# Patient Record
Sex: FEMALE | Race: WHITE | NOT HISPANIC OR LATINO | Employment: FULL TIME | URBAN - METROPOLITAN AREA
[De-identification: names, ages, dates, MRNs, and addresses within clinical notes are randomized per-mention and may not be internally consistent; named-entity substitution may affect disease eponyms.]

---

## 2023-08-28 LAB
ALANINE AMINOTRANSFERASE (SGPT) (U/L) IN SER/PLAS: 19 U/L (ref 7–45)
ALBUMIN (G/DL) IN SER/PLAS: 4.6 G/DL (ref 3.4–5)
ALKALINE PHOSPHATASE (U/L) IN SER/PLAS: 82 U/L (ref 33–136)
ANION GAP IN SER/PLAS: 10 MMOL/L (ref 10–20)
ASPARTATE AMINOTRANSFERASE (SGOT) (U/L) IN SER/PLAS: 19 U/L (ref 9–39)
BILIRUBIN TOTAL (MG/DL) IN SER/PLAS: 0.3 MG/DL (ref 0–1.2)
CALCIUM (MG/DL) IN SER/PLAS: 10.7 MG/DL (ref 8.6–10.6)
CARBON DIOXIDE, TOTAL (MMOL/L) IN SER/PLAS: 27 MMOL/L (ref 21–32)
CHLORIDE (MMOL/L) IN SER/PLAS: 107 MMOL/L (ref 98–107)
CHOLESTEROL (MG/DL) IN SER/PLAS: 270 MG/DL (ref 0–199)
CHOLESTEROL IN HDL (MG/DL) IN SER/PLAS: 54.7 MG/DL
CHOLESTEROL/HDL RATIO: 4.9
CREATININE (MG/DL) IN SER/PLAS: 0.65 MG/DL (ref 0.5–1.05)
ESTIMATED AVERAGE GLUCOSE FOR HBA1C: 103 MG/DL
GFR FEMALE: >90 ML/MIN/1.73M2
GLUCOSE (MG/DL) IN SER/PLAS: 82 MG/DL (ref 74–99)
HEMOGLOBIN A1C/HEMOGLOBIN TOTAL IN BLOOD: 5.2 %
LDL: 190 MG/DL (ref 0–99)
POTASSIUM (MMOL/L) IN SER/PLAS: 4.1 MMOL/L (ref 3.5–5.3)
PROTEIN TOTAL: 7.5 G/DL (ref 6.4–8.2)
SODIUM (MMOL/L) IN SER/PLAS: 140 MMOL/L (ref 136–145)
THYROTROPIN (MIU/L) IN SER/PLAS BY DETECTION LIMIT <= 0.05 MIU/L: 1.03 MIU/L (ref 0.44–3.98)
TRIGLYCERIDE (MG/DL) IN SER/PLAS: 129 MG/DL (ref 0–149)
UREA NITROGEN (MG/DL) IN SER/PLAS: 18 MG/DL (ref 6–23)
VLDL: 26 MG/DL (ref 0–40)

## 2023-12-15 ENCOUNTER — TELEPHONE (OUTPATIENT)
Dept: PRIMARY CARE | Facility: CLINIC | Age: 68
End: 2023-12-15
Payer: COMMERCIAL

## 2023-12-27 ENCOUNTER — OFFICE VISIT (OUTPATIENT)
Dept: PRIMARY CARE | Facility: CLINIC | Age: 68
End: 2023-12-27
Payer: COMMERCIAL

## 2023-12-27 VITALS
WEIGHT: 154.32 LBS | OXYGEN SATURATION: 97 % | HEIGHT: 56 IN | HEART RATE: 68 BPM | RESPIRATION RATE: 17 BRPM | BODY MASS INDEX: 34.72 KG/M2 | SYSTOLIC BLOOD PRESSURE: 180 MMHG | DIASTOLIC BLOOD PRESSURE: 100 MMHG | TEMPERATURE: 97.3 F

## 2023-12-27 DIAGNOSIS — I10 BENIGN ESSENTIAL HYPERTENSION: Primary | ICD-10-CM

## 2023-12-27 DIAGNOSIS — R53.83 FATIGUE, UNSPECIFIED TYPE: ICD-10-CM

## 2023-12-27 DIAGNOSIS — R73.09 ELEVATED GLUCOSE: ICD-10-CM

## 2023-12-27 PROBLEM — R56.9 SEIZURES (MULTI): Status: ACTIVE | Noted: 2023-12-27

## 2023-12-27 PROBLEM — Z96.1 PSEUDOPHAKIA: Status: ACTIVE | Noted: 2021-09-07

## 2023-12-27 PROBLEM — M47.9 DEGENERATIVE ARTHRITIS OF SPINE: Status: ACTIVE | Noted: 2023-12-27

## 2023-12-27 PROBLEM — M10.9 GOUT: Status: ACTIVE | Noted: 2023-12-27

## 2023-12-27 PROBLEM — K22.4 ESOPHAGEAL SPASM: Status: ACTIVE | Noted: 2023-12-27

## 2023-12-27 PROBLEM — M70.62 TROCHANTERIC BURSITIS OF LEFT HIP: Status: ACTIVE | Noted: 2023-12-27

## 2023-12-27 PROBLEM — E21.3 HYPERPARATHYROIDISM (MULTI): Status: ACTIVE | Noted: 2023-12-27

## 2023-12-27 PROBLEM — E55.9 VITAMIN D DEFICIENCY: Status: ACTIVE | Noted: 2023-12-27

## 2023-12-27 PROBLEM — E66.3 OVERWEIGHT WITH BODY MASS INDEX (BMI) 25.0-29.9: Status: ACTIVE | Noted: 2023-12-27

## 2023-12-27 PROBLEM — M25.50 ARTHRALGIA: Status: ACTIVE | Noted: 2023-12-27

## 2023-12-27 PROBLEM — E87.6 HYPOKALEMIA: Status: ACTIVE | Noted: 2023-12-27

## 2023-12-27 PROBLEM — G47.20 CIRCADIAN RHYTHM SLEEP DISORDER: Status: ACTIVE | Noted: 2023-12-27

## 2023-12-27 PROBLEM — M54.10 RADICULOPATHY: Status: ACTIVE | Noted: 2023-12-27

## 2023-12-27 PROBLEM — M79.2 NEURALGIA AND NEURITIS: Status: ACTIVE | Noted: 2023-12-27

## 2023-12-27 PROBLEM — H25.12 AGE-RELATED NUCLEAR CATARACT OF LEFT EYE: Status: ACTIVE | Noted: 2021-09-27

## 2023-12-27 PROBLEM — R10.11 ABDOMINAL PAIN, RUQ (RIGHT UPPER QUADRANT): Status: ACTIVE | Noted: 2023-12-27

## 2023-12-27 PROBLEM — G57.12 MERALGIA PARESTHETICA OF LEFT SIDE: Status: ACTIVE | Noted: 2023-12-27

## 2023-12-27 PROBLEM — L56.8 PHOTOSENSITIVITY DERMATITIS: Status: ACTIVE | Noted: 2023-12-27

## 2023-12-27 PROBLEM — I95.9 HYPOTENSION: Status: ACTIVE | Noted: 2023-12-27

## 2023-12-27 PROBLEM — J20.9 ACUTE BRONCHITIS: Status: ACTIVE | Noted: 2023-12-27

## 2023-12-27 PROBLEM — R42 DIZZINESS: Status: ACTIVE | Noted: 2023-12-27

## 2023-12-27 PROBLEM — H25.13 NUCLEAR SCLEROTIC CATARACT OF BOTH EYES: Status: ACTIVE | Noted: 2021-07-06

## 2023-12-27 PROBLEM — R63.4 ABNORMAL WEIGHT LOSS: Status: ACTIVE | Noted: 2023-12-27

## 2023-12-27 PROBLEM — I65.23 ATHEROSCLEROSIS OF BOTH CAROTID ARTERIES: Status: ACTIVE | Noted: 2023-12-27

## 2023-12-27 PROBLEM — N20.0 KIDNEY STONE: Status: ACTIVE | Noted: 2023-12-27

## 2023-12-27 PROBLEM — R42 VERTIGO: Status: ACTIVE | Noted: 2023-12-27

## 2023-12-27 PROBLEM — I49.9 CARDIAC ARRHYTHMIA: Status: ACTIVE | Noted: 2023-12-27

## 2023-12-27 PROBLEM — E83.52 HYPERCALCEMIA: Status: ACTIVE | Noted: 2023-12-27

## 2023-12-27 PROBLEM — J01.80 OTHER ACUTE SINUSITIS: Status: ACTIVE | Noted: 2023-12-27

## 2023-12-27 PROCEDURE — 1160F RVW MEDS BY RX/DR IN RCRD: CPT | Performed by: PHYSICIAN ASSISTANT

## 2023-12-27 PROCEDURE — 3080F DIAST BP >= 90 MM HG: CPT | Performed by: PHYSICIAN ASSISTANT

## 2023-12-27 PROCEDURE — 1159F MED LIST DOCD IN RCRD: CPT | Performed by: PHYSICIAN ASSISTANT

## 2023-12-27 PROCEDURE — 99214 OFFICE O/P EST MOD 30 MIN: CPT | Performed by: PHYSICIAN ASSISTANT

## 2023-12-27 PROCEDURE — 1036F TOBACCO NON-USER: CPT | Performed by: PHYSICIAN ASSISTANT

## 2023-12-27 PROCEDURE — 3077F SYST BP >= 140 MM HG: CPT | Performed by: PHYSICIAN ASSISTANT

## 2023-12-27 RX ORDER — PREDNISOLONE ACETATE 10 MG/ML
1 SUSPENSION/ DROPS OPHTHALMIC 4 TIMES DAILY
COMMUNITY
Start: 2021-07-07

## 2023-12-27 RX ORDER — HYDROCHLOROTHIAZIDE 50 MG/1
25 TABLET ORAL DAILY
Qty: 90 TABLET | Refills: 0 | Status: SHIPPED
Start: 2023-12-27 | End: 2024-02-21 | Stop reason: ALTCHOICE

## 2023-12-27 RX ORDER — METOCLOPRAMIDE 5 MG/1
TABLET ORAL
COMMUNITY
Start: 2015-11-02

## 2023-12-27 RX ORDER — POLYMYXIN B SULFATE AND TRIMETHOPRIM 1; 10000 MG/ML; [USP'U]/ML
1 SOLUTION OPHTHALMIC 4 TIMES DAILY
COMMUNITY
Start: 2021-07-07

## 2023-12-27 RX ORDER — LOSARTAN POTASSIUM 25 MG/1
25 TABLET ORAL
COMMUNITY
Start: 2023-07-14 | End: 2023-12-27 | Stop reason: SDUPTHER

## 2023-12-27 RX ORDER — METOPROLOL SUCCINATE 50 MG/1
TABLET, EXTENDED RELEASE ORAL
COMMUNITY
Start: 2022-06-08 | End: 2024-03-18 | Stop reason: SDUPTHER

## 2023-12-27 RX ORDER — DICYCLOMINE HYDROCHLORIDE 20 MG/1
20 TABLET ORAL EVERY 8 HOURS PRN
COMMUNITY
Start: 2015-07-26

## 2023-12-27 RX ORDER — LOSARTAN POTASSIUM 50 MG/1
50 TABLET ORAL
Qty: 90 TABLET | Refills: 0 | Status: SHIPPED
Start: 2023-12-27 | End: 2024-02-21 | Stop reason: ALTCHOICE

## 2023-12-27 RX ORDER — OMEPRAZOLE 20 MG/1
CAPSULE, DELAYED RELEASE ORAL
COMMUNITY
Start: 2015-07-26 | End: 2024-06-05 | Stop reason: ALTCHOICE

## 2023-12-27 RX ORDER — ASPIRIN 325 MG
1 TABLET, DELAYED RELEASE (ENTERIC COATED) ORAL
COMMUNITY
Start: 2021-03-23 | End: 2024-06-05 | Stop reason: ALTCHOICE

## 2023-12-27 RX ORDER — SPIRONOLACTONE 25 MG/1
1 TABLET ORAL DAILY
COMMUNITY
Start: 2022-06-08 | End: 2024-06-05 | Stop reason: ALTCHOICE

## 2023-12-27 RX ORDER — HYDROCHLOROTHIAZIDE 50 MG/1
TABLET ORAL
COMMUNITY
Start: 2009-10-20 | End: 2023-12-27 | Stop reason: SDUPTHER

## 2023-12-27 RX ORDER — OXYCODONE AND ACETAMINOPHEN 5; 325 MG/1; MG/1
TABLET ORAL EVERY 12 HOURS
COMMUNITY
Start: 2015-07-27 | End: 2024-03-06 | Stop reason: SDUPTHER

## 2023-12-27 ASSESSMENT — ENCOUNTER SYMPTOMS
VOMITING: 0
NERVOUS/ANXIOUS: 0
NAUSEA: 0
PSYCHIATRIC NEGATIVE: 1
EYES NEGATIVE: 1
CHEST TIGHTNESS: 0
ARTHRALGIAS: 0
COUGH: 0
NEUROLOGICAL NEGATIVE: 1
DEPRESSION: 0
ABDOMINAL PAIN: 0
LOSS OF SENSATION IN FEET: 0
HYPERTENSION: 1
MUSCULOSKELETAL NEGATIVE: 1
HEADACHES: 0
WHEEZING: 0
BACK PAIN: 0
HEMATOLOGIC/LYMPHATIC NEGATIVE: 1
DIZZINESS: 0
ENDOCRINE NEGATIVE: 1
PALPITATIONS: 0
SHORTNESS OF BREATH: 0
RESPIRATORY NEGATIVE: 1
OCCASIONAL FEELINGS OF UNSTEADINESS: 0
CONSTITUTIONAL NEGATIVE: 1
ALLERGIC/IMMUNOLOGIC NEGATIVE: 1

## 2023-12-27 NOTE — PROGRESS NOTES
"Subjective   Patient ID: Gabriela Lopez is a 68 y.o. female who presents for Hypertension.    Hypertension  Pertinent negatives include no chest pain, headaches, palpitations or shortness of breath.        Review of Systems   Constitutional: Negative.    HENT: Negative.  Negative for congestion.    Eyes: Negative.    Respiratory: Negative.  Negative for cough, chest tightness, shortness of breath and wheezing.    Cardiovascular:  Negative for chest pain and palpitations.   Gastrointestinal:  Negative for abdominal pain, nausea and vomiting.   Endocrine: Negative.    Genitourinary: Negative.    Musculoskeletal: Negative.  Negative for arthralgias and back pain.   Skin: Negative.  Negative for rash.   Allergic/Immunologic: Negative.    Neurological: Negative.  Negative for dizziness and headaches.   Hematological: Negative.    Psychiatric/Behavioral: Negative.  The patient is not nervous/anxious.        Objective   BP (!) 180/100 (BP Location: Right arm, Patient Position: Sitting)   Pulse 68   Temp 36.3 °C (97.3 °F)   Resp 17   Ht 1.422 m (4' 8\")   Wt 70 kg (154 lb 5.2 oz)   SpO2 97%   BMI 34.60 kg/m²     Physical Exam  Vitals and nursing note reviewed.   Constitutional:       Appearance: Normal appearance. She is obese.   HENT:      Head: Normocephalic and atraumatic.   Eyes:      Extraocular Movements: Extraocular movements intact.      Pupils: Pupils are equal, round, and reactive to light.   Cardiovascular:      Rate and Rhythm: Normal rate and regular rhythm.      Heart sounds: Normal heart sounds.   Pulmonary:      Effort: Pulmonary effort is normal.      Breath sounds: Normal breath sounds.   Abdominal:      General: Bowel sounds are normal.      Palpations: Abdomen is soft.   Genitourinary:     Comments: defer  Musculoskeletal:         General: Normal range of motion.      Cervical back: Normal range of motion and neck supple.   Skin:     General: Skin is warm and dry.   Neurological:      General: No " focal deficit present.      Mental Status: She is alert and oriented to person, place, and time.   Psychiatric:         Mood and Affect: Mood normal.         Behavior: Behavior normal.         Thought Content: Thought content normal.         Judgment: Judgment normal.         Assessment/Plan   Problem List Items Addressed This Visit       Benign essential hypertension - Primary    Relevant Medications    hydroCHLOROthiazide (HYDRODiuril) 50 mg tablet    losartan (Cozaar) 50 mg tablet    Elevated glucose    Fatigue        htn 0besity -   -stable  -poor control  -Continue current medications, side effects, risks and options discussed, patient aware   -pt was not takingmediction as directed  - pt to take hydrochlorothiazide 25mg once daily and losartan 50mg once daily   -Discussed medication dosage, usage, goals of therapy, and side effects  -Encouraged dietary sodium restriction/ DASH diet  -Recommend regular aerobic exercise  -Discussed need and benefit for weight loss  -Recheck in 2 weeks or sooner should any symptoms or problems arise or ER if continues to remain elevated   - Goal of blood pressure less than 130/80- ER if BP elevated   -Recommend blood pressure monitoring and bring results in on next visit           complexity visit of 30 + minutes face-to-face with at least half of the time discussing  Results of my examination, clinical findings, impression and diagnoses discussed with the patient as well as results of the latest test/lab work. The patient was in agreement with the plan and verbalized a good understanding of instructions and plans for treatment. At the end of the visit today, the patient felt that all questions had been answered satisfactorily. The patient was pleased with the visit and very appreciative for the care rendered.

## 2024-01-10 ENCOUNTER — OFFICE VISIT (OUTPATIENT)
Dept: PRIMARY CARE | Facility: CLINIC | Age: 69
End: 2024-01-10
Payer: COMMERCIAL

## 2024-01-10 VITALS
OXYGEN SATURATION: 96 % | SYSTOLIC BLOOD PRESSURE: 160 MMHG | HEART RATE: 66 BPM | DIASTOLIC BLOOD PRESSURE: 82 MMHG | WEIGHT: 154.32 LBS | BODY MASS INDEX: 34.6 KG/M2 | RESPIRATION RATE: 18 BRPM

## 2024-01-10 DIAGNOSIS — E21.3 HYPERPARATHYROIDISM (MULTI): ICD-10-CM

## 2024-01-10 DIAGNOSIS — I10 BENIGN ESSENTIAL HYPERTENSION: ICD-10-CM

## 2024-01-10 DIAGNOSIS — R56.9 SEIZURES (MULTI): ICD-10-CM

## 2024-01-10 PROCEDURE — 3079F DIAST BP 80-89 MM HG: CPT | Performed by: PHYSICIAN ASSISTANT

## 2024-01-10 PROCEDURE — 3008F BODY MASS INDEX DOCD: CPT | Performed by: PHYSICIAN ASSISTANT

## 2024-01-10 PROCEDURE — 3077F SYST BP >= 140 MM HG: CPT | Performed by: PHYSICIAN ASSISTANT

## 2024-01-10 PROCEDURE — 93000 ELECTROCARDIOGRAM COMPLETE: CPT | Performed by: PHYSICIAN ASSISTANT

## 2024-01-10 PROCEDURE — 1036F TOBACCO NON-USER: CPT | Performed by: PHYSICIAN ASSISTANT

## 2024-01-10 PROCEDURE — 1160F RVW MEDS BY RX/DR IN RCRD: CPT | Performed by: PHYSICIAN ASSISTANT

## 2024-01-10 PROCEDURE — 1159F MED LIST DOCD IN RCRD: CPT | Performed by: PHYSICIAN ASSISTANT

## 2024-01-10 PROCEDURE — 99214 OFFICE O/P EST MOD 30 MIN: CPT | Performed by: PHYSICIAN ASSISTANT

## 2024-01-10 ASSESSMENT — ENCOUNTER SYMPTOMS
CHEST TIGHTNESS: 0
BACK PAIN: 0
RESPIRATORY NEGATIVE: 1
HEMATOLOGIC/LYMPHATIC NEGATIVE: 1
MUSCULOSKELETAL NEGATIVE: 1
SHORTNESS OF BREATH: 0
ENDOCRINE NEGATIVE: 1
NERVOUS/ANXIOUS: 0
NAUSEA: 0
COUGH: 0
ARTHRALGIAS: 0
ABDOMINAL PAIN: 0
CONSTITUTIONAL NEGATIVE: 1
DIZZINESS: 0
PALPITATIONS: 0
HEADACHES: 0
EYES NEGATIVE: 1
CARDIOVASCULAR NEGATIVE: 1
VOMITING: 0
GASTROINTESTINAL NEGATIVE: 1
NEUROLOGICAL NEGATIVE: 1
WHEEZING: 0
ALLERGIC/IMMUNOLOGIC NEGATIVE: 1
PSYCHIATRIC NEGATIVE: 1

## 2024-01-10 NOTE — PROGRESS NOTES
Subjective   Patient ID: Gabriela Lopez is a 68 y.o. female who presents for Hypertension.    HPI     Patient Gabriela Lopez 68 y.o. female is here today for follow up   HTN - improving  140/90s at home - pt not taking medication as directed   Taking hydrochlorothiazide and metroprolol in the am and using losartan only as she thinks needed  in pm after work   No edema  no CP no SOB   patient states feeling well otherwise  denies fever, chills, N/V, headache, dizziness, CP, SOB, palpitations, edema, numbness, tingling, weakness  A chaperone was offered to the patient for the physical exam /  exam and was declined          Review of Systems   Constitutional: Negative.    HENT: Negative.     Eyes: Negative.    Respiratory: Negative.  Negative for cough, chest tightness, shortness of breath and wheezing.    Cardiovascular: Negative.  Negative for chest pain and palpitations.   Gastrointestinal: Negative.  Negative for abdominal pain, nausea and vomiting.   Endocrine: Negative.    Genitourinary: Negative.    Musculoskeletal: Negative.  Negative for arthralgias and back pain.   Skin: Negative.  Negative for rash.   Allergic/Immunologic: Negative.    Neurological: Negative.  Negative for dizziness and headaches.   Hematological: Negative.    Psychiatric/Behavioral: Negative.  The patient is not nervous/anxious.        Objective   /90 (BP Location: Right arm, Patient Position: Sitting)   Pulse 66   Resp 18   Wt 70 kg (154 lb 5.2 oz)   SpO2 96%   BMI 34.60 kg/m²     Physical Exam  Constitutional:       Appearance: Normal appearance. She is obese.   HENT:      Head: Normocephalic and atraumatic.   Cardiovascular:      Rate and Rhythm: Normal rate and regular rhythm.      Heart sounds: Normal heart sounds.   Pulmonary:      Effort: Pulmonary effort is normal.      Breath sounds: Normal breath sounds.   Abdominal:      General: Bowel sounds are normal.      Palpations: Abdomen is soft.   Musculoskeletal:          General: Normal range of motion.      Cervical back: Neck supple.   Skin:     General: Skin is warm and dry.   Neurological:      Mental Status: She is alert and oriented to person, place, and time.   Psychiatric:         Mood and Affect: Mood normal.         Behavior: Behavior normal.         Thought Content: Thought content normal.         Judgment: Judgment normal.       Assessment/Plan   Problem List Items Addressed This Visit       Benign essential hypertension    Hyperparathyroidism (CMS/HCC)    Seizures (CMS/HCC)     Other Visit Diagnoses       BMI 35.0-35.9,adult    -  Primary             htn 0besity -   -stable, no CP no SOB   - better control  -Continue current medications, side effects, risks and options discussed, patient aware   -pt was not takingmediction as directed  - pt to take hydrochlorothiazide 25mg once daily and losartan 50mg once daily   -Discussed medication dosage, usage, goals of therapy, and side effects  -Encouraged dietary sodium restriction/ DASH diet  -Recommend regular aerobic exercise  -Discussed need and benefit for weight loss  -Recheck in 4-8 weeks or sooner should any symptoms or problems arise or ER if continues to remain elevated   - Goal of blood pressure less than 130/80- ER if BP elevated more than 180/90   -Recommend blood pressure monitoring and bring results in on next visit   - EKG today in office normal sinus rhythm         complexity visit of 30 + minutes face-to-face with at least half of the time discussing  Results of my examination, clinical findings, impression and diagnoses discussed with the patient as well as results of the latest test/lab work. The patient was in agreement with the plan and verbalized a good understanding of instructions and plans for treatment. At the end of the visit today, the patient felt that all questions had been answered satisfactorily. The patient was pleased with the visit and very appreciative for the care rendered.

## 2024-02-15 ENCOUNTER — TELEPHONE (OUTPATIENT)
Dept: PRIMARY CARE | Facility: CLINIC | Age: 69
End: 2024-02-15
Payer: COMMERCIAL

## 2024-02-21 ENCOUNTER — OFFICE VISIT (OUTPATIENT)
Dept: PRIMARY CARE | Facility: CLINIC | Age: 69
End: 2024-02-21
Payer: COMMERCIAL

## 2024-02-21 VITALS — SYSTOLIC BLOOD PRESSURE: 180 MMHG | DIASTOLIC BLOOD PRESSURE: 92 MMHG | RESPIRATION RATE: 16 BRPM | HEART RATE: 80 BPM

## 2024-02-21 DIAGNOSIS — I10 ESSENTIAL HYPERTENSION: ICD-10-CM

## 2024-02-21 DIAGNOSIS — F41.8 DEPRESSION WITH ANXIETY: Primary | ICD-10-CM

## 2024-02-21 PROCEDURE — 1159F MED LIST DOCD IN RCRD: CPT | Performed by: INTERNAL MEDICINE

## 2024-02-21 PROCEDURE — 1160F RVW MEDS BY RX/DR IN RCRD: CPT | Performed by: INTERNAL MEDICINE

## 2024-02-21 PROCEDURE — 3080F DIAST BP >= 90 MM HG: CPT | Performed by: INTERNAL MEDICINE

## 2024-02-21 PROCEDURE — 1036F TOBACCO NON-USER: CPT | Performed by: INTERNAL MEDICINE

## 2024-02-21 PROCEDURE — 99214 OFFICE O/P EST MOD 30 MIN: CPT | Performed by: INTERNAL MEDICINE

## 2024-02-21 PROCEDURE — 3077F SYST BP >= 140 MM HG: CPT | Performed by: INTERNAL MEDICINE

## 2024-02-21 PROCEDURE — 3008F BODY MASS INDEX DOCD: CPT | Performed by: INTERNAL MEDICINE

## 2024-02-21 RX ORDER — LOSARTAN POTASSIUM AND HYDROCHLOROTHIAZIDE 25; 100 MG/1; MG/1
1 TABLET ORAL DAILY
Qty: 30 TABLET | Refills: 11 | Status: SHIPPED | OUTPATIENT
Start: 2024-02-21 | End: 2025-02-20

## 2024-02-21 RX ORDER — AMLODIPINE BESYLATE 5 MG/1
5 TABLET ORAL DAILY
Qty: 30 TABLET | Refills: 5 | Status: SHIPPED | OUTPATIENT
Start: 2024-02-21 | End: 2024-06-05 | Stop reason: SDUPTHER

## 2024-02-21 RX ORDER — CITALOPRAM 10 MG/1
10 TABLET ORAL DAILY
Qty: 30 TABLET | Refills: 1 | Status: SHIPPED
Start: 2024-02-21 | End: 2024-03-06 | Stop reason: SDUPTHER

## 2024-02-21 NOTE — PROGRESS NOTES
68-year-old female with history of hypertension here for follow-up of blood pressure.  Patient of my partner who was seen recently for uncontrolled hypertension, was told to follow closely.  Home readings are 170s to 190s over 90s to 100.  She is taking spironolactone, metoprolol, losartan.  She is not taking her hydrochlorothiazide, she is out.  Denies any headache chest pain shortness of breath, no orthopnea PND.  Initially she denies any anxiety but on further questioning she says since her 's death more than a year ago she has been very depressed, lack of energy lack of motivation, not really sleeping, very anxious, denies any thoughts of harming self or others.  No hallucinations no manic episodes.  No drug use.  Otherwise she is doing well.    No fevers no chills, no weight changes.    No URI symptoms    No cough no shortness of breath    No chest pain no palpitations    Appetite intact, no abdominal pain.    No new or unusual headaches.    Vitals and exam:Blood pressure (!) 180/92, pulse 80, resp. rate 16.  Calm coherent and appropriate    Neck is supple and none tender    Breathing comfortably, clear to auscultation bilaterally    Regular rate rhythm, no murmur gallops or rubs.  Good distal pulses.  No edema.  No JVD.    Abdomen soft and nontender, normal bowel sounds.    Extremities warm well perfused with no clubbing or cyanosis    Cognition intact.    Cranial nerves II through XII intact, alert oriented x 3, strength is 5 out of 5 upper and lower extremities.  Gait normal.    Assessment plan: 68-year-old female here for follow-up of hypertension, my first time seeing the patient, chart reviewed.    Hypertension: Still elevated, lifestyle is not very healthy, briefly counseled, strongly encouraged her to reduce salt intake.  Reassuring exam.  Continue with metoprolol, spironolactone, changed losartan 50 mg to losartan 100 mg-HCTZ 25 mg combo pill, add amlodipine 5 mg.  Check renal function on  Monday.  Continue monitoring at home, call if systolics persistently above 150 or diastolics above 80.  Follow-up with primary PCP in 2 to 3 weeks.    I also think some of her elevated blood pressure is due to her anxiety and lack of sleep, we discussed treatment options for depression anxiety, she would like to start, citalopram 10 mg at bedtime.  Call with any side effects.

## 2024-02-21 NOTE — PATIENT INSTRUCTIONS
Please stop taking losartan 50.  Please start taking following medications:    Losartan 100-HCTZ 25.  Once a day in the morning.    Amlodipine 5 mg once a day in the morning.    Citalopram for depression anxiety, 10 mg at bedtime.    Please continue taking spironolactone and metoprolol.    Please check your blood on Monday.

## 2024-03-06 ENCOUNTER — LAB (OUTPATIENT)
Dept: LAB | Facility: LAB | Age: 69
End: 2024-03-06
Payer: COMMERCIAL

## 2024-03-06 ENCOUNTER — OFFICE VISIT (OUTPATIENT)
Dept: PRIMARY CARE | Facility: CLINIC | Age: 69
End: 2024-03-06
Payer: COMMERCIAL

## 2024-03-06 VITALS
WEIGHT: 152 LBS | HEIGHT: 56 IN | DIASTOLIC BLOOD PRESSURE: 78 MMHG | SYSTOLIC BLOOD PRESSURE: 132 MMHG | HEART RATE: 63 BPM | OXYGEN SATURATION: 95 % | BODY MASS INDEX: 34.19 KG/M2

## 2024-03-06 DIAGNOSIS — F41.8 DEPRESSION WITH ANXIETY: ICD-10-CM

## 2024-03-06 DIAGNOSIS — E78.2 MIXED HYPERLIPIDEMIA: ICD-10-CM

## 2024-03-06 DIAGNOSIS — Z12.31 BREAST CANCER SCREENING BY MAMMOGRAM: ICD-10-CM

## 2024-03-06 DIAGNOSIS — Z00.00 ROUTINE PHYSICAL EXAMINATION: Primary | ICD-10-CM

## 2024-03-06 DIAGNOSIS — Z12.11 COLON CANCER SCREENING: ICD-10-CM

## 2024-03-06 DIAGNOSIS — F32.A DEPRESSION, UNSPECIFIED DEPRESSION TYPE: ICD-10-CM

## 2024-03-06 DIAGNOSIS — Z00.00 ROUTINE PHYSICAL EXAMINATION: ICD-10-CM

## 2024-03-06 DIAGNOSIS — N39.0 URINARY TRACT INFECTION WITHOUT HEMATURIA, SITE UNSPECIFIED: ICD-10-CM

## 2024-03-06 DIAGNOSIS — N23 RENAL COLIC: ICD-10-CM

## 2024-03-06 DIAGNOSIS — I10 PRIMARY HYPERTENSION: ICD-10-CM

## 2024-03-06 LAB
HCV AB SER QL: NONREACTIVE
POC APPEARANCE, URINE: CLEAR
POC BILIRUBIN, URINE: NEGATIVE
POC BLOOD, URINE: ABNORMAL
POC COLOR, URINE: YELLOW
POC GLUCOSE, URINE: NEGATIVE MG/DL
POC KETONES, URINE: NEGATIVE MG/DL
POC LEUKOCYTES, URINE: ABNORMAL
POC NITRITE,URINE: NEGATIVE
POC PH, URINE: 5.5 PH
POC PROTEIN, URINE: ABNORMAL MG/DL
POC SPECIFIC GRAVITY, URINE: 1.02
POC UROBILINOGEN, URINE: 0.2 EU/DL

## 2024-03-06 PROCEDURE — 1160F RVW MEDS BY RX/DR IN RCRD: CPT | Performed by: PHYSICIAN ASSISTANT

## 2024-03-06 PROCEDURE — 99397 PER PM REEVAL EST PAT 65+ YR: CPT | Performed by: PHYSICIAN ASSISTANT

## 2024-03-06 PROCEDURE — 1159F MED LIST DOCD IN RCRD: CPT | Performed by: PHYSICIAN ASSISTANT

## 2024-03-06 PROCEDURE — 3008F BODY MASS INDEX DOCD: CPT | Performed by: PHYSICIAN ASSISTANT

## 2024-03-06 PROCEDURE — 3075F SYST BP GE 130 - 139MM HG: CPT | Performed by: PHYSICIAN ASSISTANT

## 2024-03-06 PROCEDURE — 3078F DIAST BP <80 MM HG: CPT | Performed by: PHYSICIAN ASSISTANT

## 2024-03-06 PROCEDURE — 87086 URINE CULTURE/COLONY COUNT: CPT

## 2024-03-06 PROCEDURE — 36415 COLL VENOUS BLD VENIPUNCTURE: CPT

## 2024-03-06 PROCEDURE — 86803 HEPATITIS C AB TEST: CPT

## 2024-03-06 PROCEDURE — 81003 URINALYSIS AUTO W/O SCOPE: CPT | Performed by: PHYSICIAN ASSISTANT

## 2024-03-06 PROCEDURE — 1036F TOBACCO NON-USER: CPT | Performed by: PHYSICIAN ASSISTANT

## 2024-03-06 RX ORDER — CITALOPRAM 20 MG/1
20 TABLET, FILM COATED ORAL DAILY
Qty: 30 TABLET | Refills: 1 | Status: SHIPPED | OUTPATIENT
Start: 2024-03-06 | End: 2024-05-05

## 2024-03-06 RX ORDER — ROSUVASTATIN CALCIUM 10 MG/1
10 TABLET, COATED ORAL DAILY
Qty: 90 TABLET | Refills: 3 | Status: SHIPPED | OUTPATIENT
Start: 2024-03-06 | End: 2025-04-10

## 2024-03-06 RX ORDER — OXYCODONE AND ACETAMINOPHEN 5; 325 MG/1; MG/1
1 TABLET ORAL DAILY PRN
Qty: 20 TABLET | Refills: 0 | Status: SHIPPED | OUTPATIENT
Start: 2024-03-06

## 2024-03-06 ASSESSMENT — ENCOUNTER SYMPTOMS
DIZZINESS: 0
BACK PAIN: 0
HEADACHES: 0
CONSTITUTIONAL NEGATIVE: 1
COUGH: 0
WHEEZING: 0
SHORTNESS OF BREATH: 0
ARTHRALGIAS: 0
PSYCHIATRIC NEGATIVE: 1
OCCASIONAL FEELINGS OF UNSTEADINESS: 0
ENDOCRINE NEGATIVE: 1
HEMATOLOGIC/LYMPHATIC NEGATIVE: 1
PALPITATIONS: 0
ALLERGIC/IMMUNOLOGIC NEGATIVE: 1
LOSS OF SENSATION IN FEET: 0
RESPIRATORY NEGATIVE: 1
NAUSEA: 0
NERVOUS/ANXIOUS: 0
NEUROLOGICAL NEGATIVE: 1
DEPRESSION: 0
ABDOMINAL PAIN: 0
VOMITING: 0
MUSCULOSKELETAL NEGATIVE: 1
EYES NEGATIVE: 1

## 2024-03-06 ASSESSMENT — PATIENT HEALTH QUESTIONNAIRE - PHQ9
2. FEELING DOWN, DEPRESSED OR HOPELESS: NOT AT ALL
SUM OF ALL RESPONSES TO PHQ9 QUESTIONS 1 AND 2: 0
1. LITTLE INTEREST OR PLEASURE IN DOING THINGS: NOT AT ALL

## 2024-03-06 NOTE — PROGRESS NOTES
Subjective   Patient ID: Gabriela Lopez is a 68 y.o. female who presents for Annual Exam (Follow up HTN).    HPI   68-year-old female with history of hypertension here for annual cpx and  follow-up of blood pressure.   uncontrolled hypertension, was told to follow closely.  Home readings are 140-130/70-80s at home  She is taking spironolactone, metoprolol, hyzaar and norvasc.  She is not taking her hydrochlorothiazide, she is out.  Denies any headache chest pain shortness of breath, no orthopnea PND.  Initially she denies any anxiety but on further questioning she says since her 's death more than a year ago she has been very depressed, lack of energy lack of motivation, not really sleeping, very anxious, denies any thoughts of harming self or others.  No hallucinations no manic episodes.  No drug use.  Otherwise she is doing well.         - works at [employer] 26 years   specialists - none   [facility] dental and vision [facility]      PMhx significant medical hx HTN - kidney stones- acid reflux - vit D def   PShx: tonsils and appendix  Social hx: etoh-social fewper year , no tobacco use, no illicit drug use   healthy diet and exercise- walks   immunizations - had covid vaccine   Past medical, surgical, social, and family history all reviewed       patient states feeling well otherwise  denies fever, chills, N/V, headache, dizziness, CP, SOB, palpitations, edema, numbness, tingling, weakness  A chaperone was offered to the patient for the physical exam /  exam and was declined     No fevers no chills, no weight changes.     No URI symptoms     No cough no shortness of breath     No chest pain no palpitations     Appetite intact, no abdominal pain.     No new or unusual headaches      Review of Systems   Constitutional: Negative.    HENT: Negative.     Eyes: Negative.    Respiratory: Negative.  Negative for cough, shortness of breath and wheezing.    Cardiovascular:  Negative for chest pain and palpitations.  "  Gastrointestinal:  Negative for abdominal pain, nausea and vomiting.   Endocrine: Negative.    Genitourinary: Negative.    Musculoskeletal: Negative.  Negative for arthralgias and back pain.   Skin: Negative.  Negative for rash.   Allergic/Immunologic: Negative.    Neurological: Negative.  Negative for dizziness and headaches.   Hematological: Negative.    Psychiatric/Behavioral: Negative.  The patient is not nervous/anxious.         + history of depression        Objective   /78 (BP Location: Right arm, Patient Position: Sitting)   Pulse 63   Ht 1.422 m (4' 8\")   Wt 68.9 kg (152 lb)   SpO2 95%   BMI 34.08 kg/m²     Physical Exam  Vitals and nursing note reviewed.   Constitutional:       Appearance: Normal appearance. She is obese.   HENT:      Head: Normocephalic and atraumatic.      Right Ear: Tympanic membrane, ear canal and external ear normal.      Left Ear: Tympanic membrane, ear canal and external ear normal.      Nose: Nose normal.      Mouth/Throat:      Mouth: Mucous membranes are moist.      Pharynx: Oropharynx is clear.   Eyes:      Extraocular Movements: Extraocular movements intact.      Pupils: Pupils are equal, round, and reactive to light.   Cardiovascular:      Rate and Rhythm: Normal rate and regular rhythm.      Heart sounds: Normal heart sounds.   Pulmonary:      Effort: Pulmonary effort is normal.      Breath sounds: Normal breath sounds.   Abdominal:      General: Abdomen is flat. Bowel sounds are normal.      Palpations: Abdomen is soft.   Genitourinary:     Comments: Per GYN   Musculoskeletal:         General: Normal range of motion.      Cervical back: Normal range of motion and neck supple.   Skin:     General: Skin is warm and dry.   Neurological:      General: No focal deficit present.      Mental Status: She is alert and oriented to person, place, and time.   Psychiatric:         Mood and Affect: Mood normal.         Behavior: Behavior normal.         Thought Content: " Thought content normal.         Judgment: Judgment normal.       Assessment/Plan   Problem List Items Addressed This Visit       Hypertensive disorder    Relevant Orders    CT cardiac scoring wo IV contrast    Renal colic    Relevant Medications    oxyCODONE-acetaminophen (Percocet) 5-325 mg tablet     Other Visit Diagnoses       Routine physical examination    -  Primary    Relevant Orders    POCT UA Automated manually resulted (Completed)    Hepatitis C Antibody    Colon cancer screening        Relevant Orders    Cologuard® colon cancer screening    Breast cancer screening by mammogram        Relevant Orders    BI mammo bilateral screening tomosynthesis    Mixed hyperlipidemia        Relevant Medications    rosuvastatin (Crestor) 10 mg tablet    Other Relevant Orders    CT cardiac scoring wo IV contrast    Depression with anxiety        Relevant Medications    citalopram (CeleXA) 20 mg tablet    Depression, unspecified depression type                 Well exam   - patient stable and healthy  - discussed healthy diet and exercise plan   - continue medications as directed, SEs, risks and options discussed   - f/u with specialists as directed   - dental and vision exams, f/u as directed   - Labs ordered today, will call when results are available   -Vaccinations due - pt declines all   -Follow-up annual exam advised yearly   -Colon cancer screening - never - DUE_ pt declined   - mammogram - DUE _   - BD due - pt declined   - ct cardiac calcium score as well   -Follow-up in one week to discuss all results      htn   -stable  -fair control  -Continue current medications, side effects, risks and options discussed, patient aware   -Encouraged dietary sodium restriction/ DASH diet  -Recommend regular aerobic exercise  -Discussed need and benefit for weight loss  -Recheck in 4 months or sooner should any symptoms or problems arise  - Goal of blood pressure less than 130/80  -Recommend blood pressure monitoring and bring  results in on next visit     Kidney pain/kidney stones  - uses oxycodone as needed - #21 for the whole year - can not refill till 2025 march - pt aware     Depression on celexa 20mg once daily - stable - no SI or HI        complexity visit of 50 + minutes face-to-face with at least half of the time discussing  Results of my examination, clinical findings, impression and diagnoses discussed with the patient as well as results of the latest test/lab work. The patient was in agreement with the plan and verbalized a good understanding of instructions and plans for treatment. At the end of the visit today, the patient felt that all questions had been answered satisfactorily. The patient was pleased with the visit and very appreciative for the care rendered.

## 2024-03-07 LAB — BACTERIA UR CULT: NORMAL

## 2024-03-18 DIAGNOSIS — I10 BENIGN ESSENTIAL HYPERTENSION: ICD-10-CM

## 2024-03-18 RX ORDER — METOPROLOL SUCCINATE 50 MG/1
50 TABLET, EXTENDED RELEASE ORAL DAILY
Qty: 90 TABLET | Refills: 1 | Status: SHIPPED | OUTPATIENT
Start: 2024-03-18 | End: 2024-03-19

## 2024-03-19 DIAGNOSIS — I10 BENIGN ESSENTIAL HYPERTENSION: ICD-10-CM

## 2024-03-19 RX ORDER — METOPROLOL SUCCINATE 50 MG/1
TABLET, EXTENDED RELEASE ORAL
Qty: 90 TABLET | Refills: 1 | Status: SHIPPED | OUTPATIENT
Start: 2024-03-19 | End: 2024-06-05 | Stop reason: SDUPTHER

## 2024-06-05 ENCOUNTER — LAB (OUTPATIENT)
Dept: LAB | Facility: LAB | Age: 69
End: 2024-06-05
Payer: COMMERCIAL

## 2024-06-05 ENCOUNTER — OFFICE VISIT (OUTPATIENT)
Dept: PRIMARY CARE | Facility: CLINIC | Age: 69
End: 2024-06-05
Payer: COMMERCIAL

## 2024-06-05 VITALS
WEIGHT: 149.69 LBS | DIASTOLIC BLOOD PRESSURE: 76 MMHG | BODY MASS INDEX: 33.67 KG/M2 | OXYGEN SATURATION: 95 % | SYSTOLIC BLOOD PRESSURE: 132 MMHG | HEIGHT: 56 IN | HEART RATE: 62 BPM

## 2024-06-05 DIAGNOSIS — I10 BENIGN ESSENTIAL HYPERTENSION: Primary | ICD-10-CM

## 2024-06-05 DIAGNOSIS — E78.2 MIXED HYPERLIPIDEMIA: ICD-10-CM

## 2024-06-05 DIAGNOSIS — I10 ESSENTIAL HYPERTENSION: ICD-10-CM

## 2024-06-05 DIAGNOSIS — F41.8 DEPRESSION WITH ANXIETY: ICD-10-CM

## 2024-06-05 LAB
ALBUMIN SERPL BCP-MCNC: 4.4 G/DL (ref 3.4–5)
ANION GAP SERPL CALC-SCNC: 13 MMOL/L (ref 10–20)
BUN SERPL-MCNC: 22 MG/DL (ref 6–23)
CALCIUM SERPL-MCNC: 11.1 MG/DL (ref 8.6–10.6)
CHLORIDE SERPL-SCNC: 105 MMOL/L (ref 98–107)
CO2 SERPL-SCNC: 29 MMOL/L (ref 21–32)
CREAT SERPL-MCNC: 0.84 MG/DL (ref 0.5–1.05)
EGFRCR SERPLBLD CKD-EPI 2021: 74 ML/MIN/1.73M*2
GLUCOSE SERPL-MCNC: 94 MG/DL (ref 74–99)
PHOSPHATE SERPL-MCNC: 3.2 MG/DL (ref 2.5–4.9)
POTASSIUM SERPL-SCNC: 4.1 MMOL/L (ref 3.5–5.3)
SODIUM SERPL-SCNC: 143 MMOL/L (ref 136–145)

## 2024-06-05 PROCEDURE — 3078F DIAST BP <80 MM HG: CPT | Performed by: PHYSICIAN ASSISTANT

## 2024-06-05 PROCEDURE — 3075F SYST BP GE 130 - 139MM HG: CPT | Performed by: PHYSICIAN ASSISTANT

## 2024-06-05 PROCEDURE — 99214 OFFICE O/P EST MOD 30 MIN: CPT | Performed by: PHYSICIAN ASSISTANT

## 2024-06-05 PROCEDURE — 1160F RVW MEDS BY RX/DR IN RCRD: CPT | Performed by: PHYSICIAN ASSISTANT

## 2024-06-05 PROCEDURE — 3008F BODY MASS INDEX DOCD: CPT | Performed by: PHYSICIAN ASSISTANT

## 2024-06-05 PROCEDURE — 1159F MED LIST DOCD IN RCRD: CPT | Performed by: PHYSICIAN ASSISTANT

## 2024-06-05 PROCEDURE — 36415 COLL VENOUS BLD VENIPUNCTURE: CPT

## 2024-06-05 PROCEDURE — 80069 RENAL FUNCTION PANEL: CPT

## 2024-06-05 PROCEDURE — 1036F TOBACCO NON-USER: CPT | Performed by: PHYSICIAN ASSISTANT

## 2024-06-05 RX ORDER — METOPROLOL SUCCINATE 50 MG/1
TABLET, EXTENDED RELEASE ORAL
Qty: 90 TABLET | Refills: 1 | Status: SHIPPED | OUTPATIENT
Start: 2024-06-05

## 2024-06-05 RX ORDER — AMLODIPINE BESYLATE 5 MG/1
5 TABLET ORAL DAILY
Qty: 30 TABLET | Refills: 5 | Status: SHIPPED | OUTPATIENT
Start: 2024-06-05 | End: 2024-12-02

## 2024-06-05 ASSESSMENT — ENCOUNTER SYMPTOMS
LOSS OF SENSATION IN FEET: 0
BACK PAIN: 0
SHORTNESS OF BREATH: 0
WHEEZING: 0
MUSCULOSKELETAL NEGATIVE: 1
NERVOUS/ANXIOUS: 0
VOMITING: 0
DIZZINESS: 0
EYES NEGATIVE: 1
PALPITATIONS: 0
CONSTITUTIONAL NEGATIVE: 1
ALLERGIC/IMMUNOLOGIC NEGATIVE: 1
COUGH: 0
ABDOMINAL PAIN: 0
NAUSEA: 0
PSYCHIATRIC NEGATIVE: 1
HEADACHES: 0
ARTHRALGIAS: 0
RESPIRATORY NEGATIVE: 1
NEUROLOGICAL NEGATIVE: 1
HEMATOLOGIC/LYMPHATIC NEGATIVE: 1
OCCASIONAL FEELINGS OF UNSTEADINESS: 0
DEPRESSION: 0
GASTROINTESTINAL NEGATIVE: 1
ENDOCRINE NEGATIVE: 1

## 2024-06-05 NOTE — PROGRESS NOTES
"Subjective   Patient ID: Gabriela Lopez is a 69 y.o. female who presents for Follow-up.    HPI   68-year-old female with history of hypertension, kidney stones depression and  here  follow-up of blood pressure.  Home readings are 130s/70s at home  She is taking metoprolol, hyzaar and norvasc.  Denies any headache chest pain shortness of breath, no orthopnea PND.     Depression well controlled on celexa. Patient denies , lack of energy lack of motivation, poor slwwp anxiety.  denies any thoughts of harming self or others.  No hallucinations no manic episodes.  No drug use.  Otherwise she is doing well.     patient states feeling well otherwise  denies fever, chills, N/V, headache, dizziness, CP, SOB, palpitations, edema, numbness, tingling, weakness  A chaperone was offered to the patient for the physical exam /  exam and was declined     Review of Systems   Constitutional: Negative.    HENT: Negative.     Eyes: Negative.    Respiratory: Negative.  Negative for cough, shortness of breath and wheezing.    Cardiovascular:  Negative for chest pain and palpitations.   Gastrointestinal: Negative.  Negative for abdominal pain, nausea and vomiting.   Endocrine: Negative.    Genitourinary: Negative.    Musculoskeletal: Negative.  Negative for arthralgias and back pain.   Skin: Negative.  Negative for rash.   Allergic/Immunologic: Negative.    Neurological: Negative.  Negative for dizziness and headaches.   Hematological: Negative.    Psychiatric/Behavioral: Negative.  The patient is not nervous/anxious.        Objective   /76 (BP Location: Right arm, Patient Position: Sitting)   Pulse 62   Ht 1.422 m (4' 8\")   Wt 67.9 kg (149 lb 11.1 oz)   SpO2 95%   BMI 33.56 kg/m²     Physical Exam  Constitutional:       Appearance: Normal appearance.   Cardiovascular:      Rate and Rhythm: Normal rate and regular rhythm.      Heart sounds: Normal heart sounds.   Pulmonary:      Effort: Pulmonary effort is normal.      Breath " sounds: Normal breath sounds.   Abdominal:      General: Bowel sounds are normal.      Palpations: Abdomen is soft.   Musculoskeletal:         General: Normal range of motion.      Cervical back: Neck supple.   Skin:     General: Skin is warm and dry.   Neurological:      General: No focal deficit present.      Mental Status: She is alert and oriented to person, place, and time.   Psychiatric:         Mood and Affect: Mood normal.         Behavior: Behavior normal.         Thought Content: Thought content normal.         Judgment: Judgment normal.       Assessment/Plan   Problem List Items Addressed This Visit       Benign essential hypertension - Primary    Relevant Medications    metoprolol succinate XL (Toprol-XL) 50 mg 24 hr tablet     Other Visit Diagnoses       Mixed hyperlipidemia        Depression with anxiety        Essential hypertension        Relevant Medications    amLODIPine (Norvasc) 5 mg tablet          htn   -stable  -good control  -Continue current medications, side effects, risks and options discussed, patient aware   -Encouraged dietary sodium restriction/ DASH diet  -Recommend regular aerobic exercise  -Discussed need and benefit for weight loss  -Recheck in 4 months or sooner should any symptoms or problems arise  - Goal of blood pressure less than 130/80  -Recommend blood pressure monitoring and bring results in on next visit     Kidney pain/kidney stones  - stable now -- uses oxycodone as needed - #21 for the whole year - can not refill till 2025 march - pt aware      Depression on celexa 20mg once daily - stable - no SI or HI         complexity visit of 30 + minutes face-to-face with at least half of the time discussing  Results of my examination, clinical findings, impression and diagnoses discussed with the patient as well as results of the latest test/lab work. The patient was in agreement with the plan and verbalized a good understanding of instructions and plans for treatment. At the  end of the visit today, the patient felt that all questions had been answered satisfactorily. The patient was pleased with the visit and very appreciative for the care rendered.

## 2024-08-06 ENCOUNTER — APPOINTMENT (OUTPATIENT)
Dept: PRIMARY CARE | Facility: CLINIC | Age: 69
End: 2024-08-06
Payer: COMMERCIAL

## 2024-08-06 VITALS
BODY MASS INDEX: 34.76 KG/M2 | DIASTOLIC BLOOD PRESSURE: 82 MMHG | WEIGHT: 154.54 LBS | SYSTOLIC BLOOD PRESSURE: 138 MMHG | HEART RATE: 68 BPM | HEIGHT: 56 IN | OXYGEN SATURATION: 96 %

## 2024-08-06 DIAGNOSIS — N20.0 CALCULUS OF KIDNEY: ICD-10-CM

## 2024-08-06 DIAGNOSIS — F41.8 DEPRESSION WITH ANXIETY: ICD-10-CM

## 2024-08-06 DIAGNOSIS — E21.3 HYPERPARATHYROIDISM (MULTI): ICD-10-CM

## 2024-08-06 DIAGNOSIS — I10 ESSENTIAL HYPERTENSION: ICD-10-CM

## 2024-08-06 DIAGNOSIS — E78.2 MIXED HYPERLIPIDEMIA: Primary | ICD-10-CM

## 2024-08-06 DIAGNOSIS — R42 VERTIGO: ICD-10-CM

## 2024-08-06 PROCEDURE — 3075F SYST BP GE 130 - 139MM HG: CPT | Performed by: PHYSICIAN ASSISTANT

## 2024-08-06 PROCEDURE — 3079F DIAST BP 80-89 MM HG: CPT | Performed by: PHYSICIAN ASSISTANT

## 2024-08-06 PROCEDURE — 1036F TOBACCO NON-USER: CPT | Performed by: PHYSICIAN ASSISTANT

## 2024-08-06 PROCEDURE — 1160F RVW MEDS BY RX/DR IN RCRD: CPT | Performed by: PHYSICIAN ASSISTANT

## 2024-08-06 PROCEDURE — 3008F BODY MASS INDEX DOCD: CPT | Performed by: PHYSICIAN ASSISTANT

## 2024-08-06 PROCEDURE — 99214 OFFICE O/P EST MOD 30 MIN: CPT | Performed by: PHYSICIAN ASSISTANT

## 2024-08-06 PROCEDURE — 1159F MED LIST DOCD IN RCRD: CPT | Performed by: PHYSICIAN ASSISTANT

## 2024-08-06 RX ORDER — MECLIZINE HYDROCHLORIDE 25 MG/1
25 TABLET ORAL 3 TIMES DAILY PRN
Qty: 30 TABLET | Refills: 0 | Status: SHIPPED | OUTPATIENT
Start: 2024-08-06 | End: 2025-08-06

## 2024-08-06 ASSESSMENT — ENCOUNTER SYMPTOMS
HEADACHES: 0
OCCASIONAL FEELINGS OF UNSTEADINESS: 0
NERVOUS/ANXIOUS: 0
WHEEZING: 0
LOSS OF SENSATION IN FEET: 0
ARTHRALGIAS: 0
DEPRESSION: 0
COUGH: 0
NAUSEA: 0
CONSTITUTIONAL NEGATIVE: 1
BACK PAIN: 0
EYES NEGATIVE: 1
VOMITING: 0
PSYCHIATRIC NEGATIVE: 1
HEMATOLOGIC/LYMPHATIC NEGATIVE: 1
ABDOMINAL PAIN: 0
RESPIRATORY NEGATIVE: 1
ALLERGIC/IMMUNOLOGIC NEGATIVE: 1
DIZZINESS: 1
MUSCULOSKELETAL NEGATIVE: 1
ENDOCRINE NEGATIVE: 1
PALPITATIONS: 0
SHORTNESS OF BREATH: 0

## 2024-08-06 ASSESSMENT — PATIENT HEALTH QUESTIONNAIRE - PHQ9
SUM OF ALL RESPONSES TO PHQ9 QUESTIONS 1 AND 2: 0
1. LITTLE INTEREST OR PLEASURE IN DOING THINGS: NOT AT ALL
2. FEELING DOWN, DEPRESSED OR HOPELESS: NOT AT ALL

## 2024-08-06 NOTE — PROGRESS NOTES
Subjective   Patient ID: Rommel Cuellar is a 69 y.o. female who presents for Follow-up.    HPI   ROMMEL CUELLAR, 69 year old female is here today  for   Follow up     - works at [employer] office 26 years     HTN - stable on medication- no CP no SOB - no edema   She is taking metoprolol, hyzaar and norvasc.  Denies any headache chest pain shortness of breath, no orthopnea PND.       kidney stones-passed 2 kidney stone within the past couple months    Hyperlipidemia - on crestor - 8/2023- due today     Depression - stable, doing well - off medication x 1 month - celexa - feels good off medication-happy no SI orf HI -  Patient denies , lack of energy lack of motivation, poor slwwp anxiety.  denies any thoughts of harming self or others.  No hallucinations no manic episodes.  No drug use.  Otherwise she is doing well.     Hx vertigo 5 years ago - was admitted for a few days - now c/o new episode of dizziness- past few day - no sxs today -  - doing ok - off work on vacation now -   took medication but not sure if they helped     Hy hyperparathyroid - per dr summers - advised surgery and pt declined - needs repeat labs today       patient states feeling well otherwise  denies fever, chills, N/V, headache, dizziness, CP, SOB, palpitations, edema, numbness, tingling, weakness  A chaperone was offered to the patient for the physical exam /  exam and was declined         Review of Systems   Constitutional: Negative.    HENT: Negative.     Eyes: Negative.    Respiratory: Negative.  Negative for cough, shortness of breath and wheezing.    Cardiovascular:  Negative for chest pain and palpitations.   Gastrointestinal:  Negative for abdominal pain, nausea and vomiting.   Endocrine: Negative.    Genitourinary: Negative.    Musculoskeletal: Negative.  Negative for arthralgias and back pain.   Skin: Negative.  Negative for rash.   Allergic/Immunologic: Negative.    Neurological:  Positive for dizziness. Negative for headaches.  "  Hematological: Negative.    Psychiatric/Behavioral: Negative.  The patient is not nervous/anxious.        Objective   /82 (BP Location: Right arm, Patient Position: Sitting)   Pulse 68   Ht 1.422 m (4' 8\")   Wt 70.1 kg (154 lb 8.7 oz)   SpO2 96%   BMI 34.65 kg/m²     Physical Exam  Constitutional:       Appearance: Normal appearance. She is obese.   HENT:      Head: Normocephalic and atraumatic.   Cardiovascular:      Rate and Rhythm: Normal rate and regular rhythm.   Pulmonary:      Effort: Pulmonary effort is normal.      Breath sounds: Normal breath sounds.   Abdominal:      General: Bowel sounds are normal.      Palpations: Abdomen is soft.   Musculoskeletal:         General: Normal range of motion.   Skin:     General: Skin is warm and dry.   Neurological:      General: No focal deficit present.      Mental Status: She is alert and oriented to person, place, and time.   Psychiatric:         Mood and Affect: Mood normal.         Behavior: Behavior normal.         Thought Content: Thought content normal.         Judgment: Judgment normal.         Assessment/Plan   Problem List Items Addressed This Visit       Calculus of kidney    Vertigo    Relevant Medications    meclizine (Antivert) 25 mg tablet    Hyperparathyroidism (Multi)    Relevant Orders    Parathyroid Hormone, Intact     Other Visit Diagnoses       Mixed hyperlipidemia    -  Primary    Relevant Orders    Comprehensive Metabolic Panel    Lipid Panel    Depression with anxiety        Essential hypertension                 htn   -stable  -fair control  -Continue current medications, side effects, risks and options discussed, patient aware   -Encouraged dietary sodium restriction/ DASH diet  -Recommend regular aerobic exercise  -Discussed need and benefit for weight loss  -Recheck in 4 months or sooner should any symptoms or problems arise  - Goal of blood pressure less than 130/80  -Recommend blood pressure monitoring and bring results in on " next visit    Hyperlipidemia -   -Continue current medication as directed  -Encouraged following a low-fat low-cholesterol diet   -Discussed the benefits of regular aerobic exercise and weight loss  -Encouraged following a low-carb healthy oil intake diet          Kidney pain/kidney stones  - uses oxycodone as needed - #21 for the whole year - can not refill till 2025 march - pt aware      Depression   stable - no SI or HI - off medication now per pt     Vertigo - stable- no symptoms today   Antivert 1/2 to one tab 3 x daily as needed   -Discussed medication dosage, usage, goals of therapy, and side effects    complexity visit of 35+  minutes face-to-face with at least half of the time discussing  Results of my examination, clinical findings, impression and diagnoses discussed with the patient as well as results of the latest test/lab work. The patient was in agreement with the plan and verbalized a good understanding of instructions and plans for treatment. At the end of the visit today, the patient felt that all questions had been answered satisfactorily. The patient was pleased with the visit and very appreciative for the care rendered.

## 2024-11-30 DIAGNOSIS — R42 VERTIGO: ICD-10-CM

## 2024-12-02 RX ORDER — MECLIZINE HYDROCHLORIDE 25 MG/1
25 TABLET ORAL 3 TIMES DAILY PRN
Qty: 30 TABLET | Refills: 0 | Status: SHIPPED | OUTPATIENT
Start: 2024-12-02

## 2024-12-19 DIAGNOSIS — I10 BENIGN ESSENTIAL HYPERTENSION: ICD-10-CM

## 2024-12-23 RX ORDER — METOPROLOL SUCCINATE 50 MG/1
TABLET, EXTENDED RELEASE ORAL
Qty: 90 TABLET | Refills: 1 | Status: SHIPPED | OUTPATIENT
Start: 2024-12-23

## 2025-01-28 ENCOUNTER — TELEPHONE (OUTPATIENT)
Dept: PRIMARY CARE | Facility: CLINIC | Age: 70
End: 2025-01-28

## 2025-02-04 ENCOUNTER — OFFICE VISIT (OUTPATIENT)
Dept: PRIMARY CARE | Facility: CLINIC | Age: 70
End: 2025-02-04
Payer: MEDICARE

## 2025-02-04 VITALS
OXYGEN SATURATION: 95 % | DIASTOLIC BLOOD PRESSURE: 84 MMHG | HEART RATE: 88 BPM | SYSTOLIC BLOOD PRESSURE: 131 MMHG | WEIGHT: 159.83 LBS | HEIGHT: 56 IN | BODY MASS INDEX: 35.96 KG/M2

## 2025-02-04 DIAGNOSIS — E21.3 HYPERPARATHYROIDISM (MULTI): ICD-10-CM

## 2025-02-04 DIAGNOSIS — I10 ESSENTIAL HYPERTENSION: ICD-10-CM

## 2025-02-04 DIAGNOSIS — R56.9 SEIZURES (MULTI): ICD-10-CM

## 2025-02-04 DIAGNOSIS — R42 VERTIGO: Primary | ICD-10-CM

## 2025-02-04 DIAGNOSIS — R82.90 UNSPECIFIED ABNORMAL FINDINGS IN URINE: ICD-10-CM

## 2025-02-04 DIAGNOSIS — F41.8 DEPRESSION WITH ANXIETY: ICD-10-CM

## 2025-02-04 DIAGNOSIS — I65.01 VERTEBRAL ARTERY STENOSIS, RIGHT: ICD-10-CM

## 2025-02-04 DIAGNOSIS — R42 DIZZINESS: ICD-10-CM

## 2025-02-04 LAB
POC APPEARANCE, URINE: CLEAR
POC BILIRUBIN, URINE: NEGATIVE
POC BLOOD, URINE: ABNORMAL
POC COLOR, URINE: YELLOW
POC GLUCOSE, URINE: NEGATIVE MG/DL
POC KETONES, URINE: NEGATIVE MG/DL
POC LEUKOCYTES, URINE: ABNORMAL
POC NITRITE,URINE: NEGATIVE
POC PH, URINE: 5.5 PH
POC PROTEIN, URINE: NEGATIVE MG/DL
POC SPECIFIC GRAVITY, URINE: 1.02
POC UROBILINOGEN, URINE: 0.2 EU/DL

## 2025-02-04 PROCEDURE — G2211 COMPLEX E/M VISIT ADD ON: HCPCS | Performed by: PHYSICIAN ASSISTANT

## 2025-02-04 PROCEDURE — 1159F MED LIST DOCD IN RCRD: CPT | Performed by: PHYSICIAN ASSISTANT

## 2025-02-04 PROCEDURE — 81003 URINALYSIS AUTO W/O SCOPE: CPT | Performed by: PHYSICIAN ASSISTANT

## 2025-02-04 PROCEDURE — 3079F DIAST BP 80-89 MM HG: CPT | Performed by: PHYSICIAN ASSISTANT

## 2025-02-04 PROCEDURE — 3008F BODY MASS INDEX DOCD: CPT | Performed by: PHYSICIAN ASSISTANT

## 2025-02-04 PROCEDURE — 1160F RVW MEDS BY RX/DR IN RCRD: CPT | Performed by: PHYSICIAN ASSISTANT

## 2025-02-04 PROCEDURE — 1036F TOBACCO NON-USER: CPT | Performed by: PHYSICIAN ASSISTANT

## 2025-02-04 PROCEDURE — 3075F SYST BP GE 130 - 139MM HG: CPT | Performed by: PHYSICIAN ASSISTANT

## 2025-02-04 PROCEDURE — 99214 OFFICE O/P EST MOD 30 MIN: CPT | Performed by: PHYSICIAN ASSISTANT

## 2025-02-04 ASSESSMENT — PATIENT HEALTH QUESTIONNAIRE - PHQ9
2. FEELING DOWN, DEPRESSED OR HOPELESS: NOT AT ALL
SUM OF ALL RESPONSES TO PHQ9 QUESTIONS 1 AND 2: 0
2. FEELING DOWN, DEPRESSED OR HOPELESS: NOT AT ALL
1. LITTLE INTEREST OR PLEASURE IN DOING THINGS: NOT AT ALL
SUM OF ALL RESPONSES TO PHQ9 QUESTIONS 1 AND 2: 0
1. LITTLE INTEREST OR PLEASURE IN DOING THINGS: NOT AT ALL

## 2025-02-04 ASSESSMENT — ENCOUNTER SYMPTOMS
CONSTITUTIONAL NEGATIVE: 1
COUGH: 0
LIGHT-HEADEDNESS: 1
LOSS OF SENSATION IN FEET: 0
FACIAL ASYMMETRY: 0
ENDOCRINE NEGATIVE: 1
EYES NEGATIVE: 1
SEIZURES: 0
RESPIRATORY NEGATIVE: 1
TREMORS: 0
ALLERGIC/IMMUNOLOGIC NEGATIVE: 1
BACK PAIN: 0
DIZZINESS: 1
ABDOMINAL PAIN: 0
VOMITING: 0
NAUSEA: 0
HEADACHES: 0
HEMATOLOGIC/LYMPHATIC NEGATIVE: 1
PALPITATIONS: 0
NERVOUS/ANXIOUS: 0
SHORTNESS OF BREATH: 0
SPEECH DIFFICULTY: 0
PSYCHIATRIC NEGATIVE: 1
WEAKNESS: 0
WHEEZING: 0
DEPRESSION: 0
ARTHRALGIAS: 0
OCCASIONAL FEELINGS OF UNSTEADINESS: 0
MUSCULOSKELETAL NEGATIVE: 1
NUMBNESS: 0

## 2025-02-04 NOTE — PROGRESS NOTES
Subjective   Patient ID: Gabriela Lopez is a 69 y.o. female who presents for Follow-up (Vertigo ).    HPI   Patient Gabriela Lopez 69 y.o. female is here today for follow up ER   Dx with Vertigo - improved -- no sxs today - no dizziness   No visual changes-- feeling better today   Scan doen in ER _ pt taking meclizine  Hx off vertigo in the past - Pt states x4-5yrs   Covid flu and RSV negative   CT  head -- Severe stenosis at the right cervical vertebral artery origin     Hx of HTN - stable on medication- no CP no SOB - no edema   She is taking metoprolol, hyzaar and norvasc.  Denies any headache chest pain shortness of breath, no orthopnea PND.        kidney stones-passed 2 kidney stone within the past couple months     Hyperlipidemia - on crestor - 8/2023- due today      Depression - stable, doing well - off medication x 1 month - celexa - feels good off medication-happy no SI orf HI -    patient states feeling well otherwise  denies fever, chills, N/V, headache, CP, SOB, palpitations, edema, numbness, tingling, weakness  A chaperone was offered to the patient for the physical exam /  exam and was declined    No recent travel or illness     Review of Systems   Constitutional: Negative.    HENT: Negative.     Eyes: Negative.    Respiratory: Negative.  Negative for cough, shortness of breath and wheezing.    Cardiovascular:  Negative for chest pain and palpitations.   Gastrointestinal:  Negative for abdominal pain, nausea and vomiting.   Endocrine: Negative.    Genitourinary: Negative.    Musculoskeletal: Negative.  Negative for arthralgias and back pain.   Skin: Negative.  Negative for rash.   Allergic/Immunologic: Negative.    Neurological:  Positive for dizziness and light-headedness. Negative for tremors, seizures, syncope, facial asymmetry, speech difficulty, weakness, numbness and headaches.        Hx vertigo    Hematological: Negative.    Psychiatric/Behavioral: Negative.  The patient is not nervous/anxious.   "      Objective   /84 (BP Location: Right arm, Patient Position: Sitting)   Pulse 88   Ht 1.422 m (4' 8\")   Wt 72.5 kg (159 lb 13.3 oz)   SpO2 95%   BMI 35.83 kg/m²     Physical Exam  Vitals and nursing note reviewed.   Constitutional:       Appearance: Normal appearance. She is obese.   Neck:      Vascular: No carotid bruit.   Cardiovascular:      Rate and Rhythm: Normal rate and regular rhythm.   Pulmonary:      Effort: Pulmonary effort is normal.      Breath sounds: Normal breath sounds.   Abdominal:      General: Bowel sounds are normal.   Musculoskeletal:         General: Normal range of motion.      Cervical back: No rigidity.   Lymphadenopathy:      Cervical: No cervical adenopathy.   Skin:     General: Skin is warm and dry.   Neurological:      General: No focal deficit present.      Mental Status: She is alert and oriented to person, place, and time.   Psychiatric:         Mood and Affect: Mood normal.         Behavior: Behavior normal.         Thought Content: Thought content normal.         Judgment: Judgment normal.       Assessment/Plan   Problem List Items Addressed This Visit       Dizziness    Relevant Orders    MR brain wo IV contrast    Vertigo - Primary    Relevant Orders    Basic Metabolic Panel    Referral to Vascular Medicine    MR brain wo IV contrast    Hyperparathyroidism (Multi)    Seizures (Multi)     Other Visit Diagnoses       Essential hypertension        Relevant Orders    Basic Metabolic Panel    Depression with anxiety        Vertebral artery stenosis, right                ER if sxs persits or worsen     Vertigo   - UA ordered in office. Will discuss results with patient.  - BMP ordered. Will discuss results with patient.   -  MRI of the brain ordered. Will FU on results with the patient   - Referred to vascular medicine   - Pt to  for medicare wellness      complexity visit of 30+  minutes face-to-face with at least half of the time discussing  Results of my " examination, clinical findings, impression and diagnoses discussed with the patient as well as results of the latest test/lab work. The patient was in agreement with the plan and verbalized a good understanding of instructions and plans for treatment. At the end of the visit today, the patient felt that all questions had been answered satisfactorily. The patient was pleased with the visit and very appreciative for the care rendered.

## 2025-02-05 DIAGNOSIS — E83.52 SERUM CALCIUM ELEVATED: ICD-10-CM

## 2025-02-05 LAB
ANION GAP SERPL CALCULATED.4IONS-SCNC: 12 MMOL/L (CALC) (ref 7–17)
BUN SERPL-MCNC: 30 MG/DL (ref 7–25)
BUN/CREAT SERPL: 37 (CALC) (ref 6–22)
CALCIUM SERPL-MCNC: 11.1 MG/DL (ref 8.6–10.4)
CHLORIDE SERPL-SCNC: 104 MMOL/L (ref 98–110)
CO2 SERPL-SCNC: 28 MMOL/L (ref 20–32)
CREAT SERPL-MCNC: 0.82 MG/DL (ref 0.6–1)
EGFRCR SERPLBLD CKD-EPI 2021: 76 ML/MIN/1.73M2
GLUCOSE SERPL-MCNC: 88 MG/DL (ref 65–139)
POTASSIUM SERPL-SCNC: 3.8 MMOL/L (ref 3.5–5.3)
SODIUM SERPL-SCNC: 144 MMOL/L (ref 135–146)

## 2025-02-06 ENCOUNTER — APPOINTMENT (OUTPATIENT)
Dept: PRIMARY CARE | Facility: CLINIC | Age: 70
End: 2025-02-06
Payer: COMMERCIAL

## 2025-02-06 DIAGNOSIS — R42 VERTIGO: Primary | ICD-10-CM

## 2025-02-06 DIAGNOSIS — R42 VERTIGO: ICD-10-CM

## 2025-02-06 LAB — BACTERIA UR CULT: NORMAL

## 2025-02-06 RX ORDER — MECLIZINE HYDROCHLORIDE 25 MG/1
25 TABLET ORAL 3 TIMES DAILY PRN
Qty: 30 TABLET | Refills: 0 | Status: SHIPPED | OUTPATIENT
Start: 2025-02-06

## 2025-02-12 LAB — PTH-INTACT SERPL-MCNC: 63 PG/ML (ref 16–77)

## 2025-02-18 ENCOUNTER — OFFICE VISIT (OUTPATIENT)
Dept: PRIMARY CARE | Facility: CLINIC | Age: 70
End: 2025-02-18
Payer: MEDICARE

## 2025-02-18 VITALS
OXYGEN SATURATION: 96 % | SYSTOLIC BLOOD PRESSURE: 130 MMHG | DIASTOLIC BLOOD PRESSURE: 82 MMHG | WEIGHT: 164 LBS | RESPIRATION RATE: 18 BRPM | HEART RATE: 86 BPM | BODY MASS INDEX: 36.77 KG/M2

## 2025-02-18 DIAGNOSIS — Z00.00 MEDICARE ANNUAL WELLNESS VISIT, SUBSEQUENT: Primary | ICD-10-CM

## 2025-02-18 DIAGNOSIS — I10 BENIGN ESSENTIAL HYPERTENSION: ICD-10-CM

## 2025-02-18 DIAGNOSIS — Z00.00 ROUTINE GENERAL MEDICAL EXAMINATION AT HEALTH CARE FACILITY: ICD-10-CM

## 2025-02-18 DIAGNOSIS — R42 VERTIGO: ICD-10-CM

## 2025-02-18 DIAGNOSIS — E66.01 OBESITY, MORBID (MULTI): ICD-10-CM

## 2025-02-18 DIAGNOSIS — F41.8 DEPRESSION WITH ANXIETY: ICD-10-CM

## 2025-02-18 DIAGNOSIS — E78.2 MIXED HYPERLIPIDEMIA: ICD-10-CM

## 2025-02-18 PROCEDURE — 3079F DIAST BP 80-89 MM HG: CPT | Performed by: PHYSICIAN ASSISTANT

## 2025-02-18 PROCEDURE — 1158F ADVNC CARE PLAN TLK DOCD: CPT | Performed by: PHYSICIAN ASSISTANT

## 2025-02-18 PROCEDURE — 99214 OFFICE O/P EST MOD 30 MIN: CPT | Performed by: PHYSICIAN ASSISTANT

## 2025-02-18 PROCEDURE — 1123F ACP DISCUSS/DSCN MKR DOCD: CPT | Performed by: PHYSICIAN ASSISTANT

## 2025-02-18 PROCEDURE — 1170F FXNL STATUS ASSESSED: CPT | Performed by: PHYSICIAN ASSISTANT

## 2025-02-18 PROCEDURE — 3075F SYST BP GE 130 - 139MM HG: CPT | Performed by: PHYSICIAN ASSISTANT

## 2025-02-18 PROCEDURE — G0439 PPPS, SUBSEQ VISIT: HCPCS | Performed by: PHYSICIAN ASSISTANT

## 2025-02-18 PROCEDURE — 1159F MED LIST DOCD IN RCRD: CPT | Performed by: PHYSICIAN ASSISTANT

## 2025-02-18 PROCEDURE — 1036F TOBACCO NON-USER: CPT | Performed by: PHYSICIAN ASSISTANT

## 2025-02-18 ASSESSMENT — ACTIVITIES OF DAILY LIVING (ADL)
TAKING_MEDICATION: INDEPENDENT
DRESSING: INDEPENDENT
BATHING: INDEPENDENT
MANAGING_FINANCES: INDEPENDENT
GROCERY_SHOPPING: INDEPENDENT
DOING_HOUSEWORK: INDEPENDENT

## 2025-02-18 ASSESSMENT — ENCOUNTER SYMPTOMS
PALPITATIONS: 0
LOSS OF SENSATION IN FEET: 0
OCCASIONAL FEELINGS OF UNSTEADINESS: 0
DIZZINESS: 0
ENDOCRINE NEGATIVE: 1
RESPIRATORY NEGATIVE: 1
CONSTITUTIONAL NEGATIVE: 1
ABDOMINAL PAIN: 0
HEMATOLOGIC/LYMPHATIC NEGATIVE: 1
NERVOUS/ANXIOUS: 0
PSYCHIATRIC NEGATIVE: 1
NEUROLOGICAL NEGATIVE: 1
SHORTNESS OF BREATH: 0
HEADACHES: 0
NAUSEA: 0
ARTHRALGIAS: 0
WHEEZING: 0
ALLERGIC/IMMUNOLOGIC NEGATIVE: 1
BACK PAIN: 0
COUGH: 0
DEPRESSION: 0
VOMITING: 0
EYES NEGATIVE: 1
MUSCULOSKELETAL NEGATIVE: 1

## 2025-02-18 NOTE — PROGRESS NOTES
Subjective   Reason for Visit: Rommel Cuellar is an 69 y.o. female here for a Medicare Wellness visit.     Past Medical, Surgical, and Family History reviewed and updated in chart.    Reviewed all medications by prescribing practitioner or clinical pharmacist (such as prescriptions, OTCs, herbal therapies and supplements) and documented in the medical record.    HPI  ROMMEL CUELLAR, 69 year old female is here today  for   Follow up      - retired lives alone - was workign at [employer]   specialists - none   DUE dental and vision UTD      PMhx significant medical hx HTN - kidney stones- acid reflux - vit D def   PShx: tonsils and appendix  Social hx: etoh-social fewper year , no tobacco use, no illicit drug use   healthy diet and exercise- walks   immunizations - had covid vaccine   Past medical, surgical, social, and family history all reviewed     HTN - stable on medication- no CP no SOB - no edema  no headache    She is taking metoprolol, hyzaar and norvasc.  Denies any headache chest pain shortness of breath, no orthopnea PND.        kidney stones-passed 2 kidney stone within the past couple months     Hyperlipidemia - on crestor -lipid due      Depression - stable, doing well - off medication x 1 month - celexa - feels good off medication-happy no SI orf HI -  Patient denies , lack of energy lack of motivation, poor slwwp anxiety.  denies any thoughts of harming self or others.  No hallucinations no manic episodes.  No drug use.  Otherwise she is doing well.      Hx vertigo x many  years  --  no sxs today -  - doing ok - mri brain tomorrow      Hy hyperparathyroid - per dr summers - advised surgery and pt declined -     patient states feeling well otherwise  denies fever, chills, N/V, headache, dizziness, CP, SOB, palpitations, edema, numbness, tingling, weakness  A chaperone was offered to the patient for the physical exam /  exam and was declined        Patient Care Team:  Nicci Mantilla PA-C as PCP -  General  Juan MOSS MD as PCP - [facility] PCP     Review of Systems   Constitutional: Negative.    HENT: Negative.     Eyes: Negative.    Respiratory: Negative.  Negative for cough, shortness of breath and wheezing.    Cardiovascular:  Negative for chest pain and palpitations.   Gastrointestinal:  Negative for abdominal pain, nausea and vomiting.   Endocrine: Negative.    Genitourinary: Negative.    Musculoskeletal: Negative.  Negative for arthralgias and back pain.   Skin: Negative.  Negative for rash.   Allergic/Immunologic: Negative.    Neurological: Negative.  Negative for dizziness and headaches.        Hx vertigo    Hematological: Negative.    Psychiatric/Behavioral: Negative.  The patient is not nervous/anxious.        Objective   Vitals:  /82 (BP Location: Left arm, Patient Position: Sitting)   Pulse 86   Resp 18   Wt 74.4 kg (164 lb)   SpO2 96%   BMI 36.77 kg/m²       Physical Exam  Vitals and nursing note reviewed.   Constitutional:       Appearance: Normal appearance. She is normal weight.   HENT:      Head: Normocephalic and atraumatic.   Eyes:      Extraocular Movements: Extraocular movements intact.      Pupils: Pupils are equal, round, and reactive to light.   Neck:      Vascular: No carotid bruit.   Cardiovascular:      Rate and Rhythm: Normal rate and regular rhythm.      Heart sounds: Normal heart sounds.   Pulmonary:      Effort: Pulmonary effort is normal.      Breath sounds: Normal breath sounds.   Abdominal:      General: Bowel sounds are normal.      Palpations: Abdomen is soft.   Genitourinary:     Comments: Per GYN   Musculoskeletal:         General: Normal range of motion.      Cervical back: Neck supple. No rigidity or tenderness.   Skin:     General: Skin is warm and dry.   Neurological:      General: No focal deficit present.      Mental Status: She is alert and oriented to person, place, and time.   Psychiatric:         Mood and Affect: Mood normal.         Behavior:  Behavior normal.         Thought Content: Thought content normal.         Judgment: Judgment normal.       Assessment & Plan  Medicare annual wellness visit, subsequent  Due for mammogram and CT calcium scoer - pt aware and ordered 1 year ago   Pt declines -- advised of risks - pt aware   Colonoscopy        Benign essential hypertension         Vertigo         Depression with anxiety         Obesity, morbid (Multi)         Routine general medical examination at health care facility    Orders:  •  1 Year Follow Up In Primary Care - Wellness Exam; Future    Mixed hyperlipidemia    Orders:  •  Lipid Panel; Future    MCR   - patient stable and healthy  - discussed healthy diet and exercise plan   - continue medications as directed, SEs, risks and options discussed   - f/u with specialists as directed   - dental and vision exams, f/u as directed   - Labs ordered today, will call when results are available   -Vaccinations due - pt declines all   -Follow-up annual exam advised yearly   -Colon cancer screening - never - DUE_ pt declined   - mammogram - DUE _  declines   - BD due - pt declined   - ct cardiac calcium score as well - declines   -Follow-up in one week to discuss all results      htn   -stable  -fair control  -Continue current medications, side effects, risks and options discussed, patient aware   -Encouraged dietary sodium restriction/ DASH diet  -Recommend regular aerobic exercise  -Discussed need and benefit for weight loss  -Recheck in 4 months or sooner should any symptoms or problems arise  - Goal of blood pressure less than 130/80  -Recommend blood pressure monitoring and bring results in on next visit       Dizzineess-improved 90% overall -- mri brain tomorrow     Kidney pain/kidney stones  - uses oxycodone as needed - #21 for the whole year - can not refill till 2025 march - pt aware      Depression on celexa 20mg once daily - stable - no SI or HI       Obesity Counseling  15 - 20 minutes were spent  counseling on diet and exercise interventions to address obesity and weight reduction.   Medicare Wellness Billing Compliance Satisfied    *This is a visual tool to show completion of required items on the day of the visit. Green checks will only appear on the date of visit.    Review all medications by prescribing practitioner or clinical pharmacist (such as prescriptions, OTCs, herbal therapies and supplements) documented in the medical record    Past Medical, Surgical, and Family History reviewed and updated in chart    Tobacco Use Reviewed    Alcohol Use Reviewed    Illicit Drug Use Reviewed    PHQ2/9    Falls in Last Year Reviewed    Home Safety Risk Factors Reviewed    Cognitive Impairment Reviewed    Patient Self Assessment and Health Status    Current Diet Reviewed    Exercise Frequency    ADL - Hearing Impairment    ADL - Bathing    ADL - Dressing    ADL - Walks in Home    IADL - Managing Finances    IADL - Grocery Shopping    IADL - Taking Medications    IADL - Doing Housework    Vision Screening       Total appt time today was 45+ minutes. Time included preparing to see the pt, obtaining the hx, performing the medically necessary appropriate physical exam, counseling & educating the pt, ordering tests & procedures, referring & communicating w/other providers, independently interpreting results & communicating the results to the pt, care, coordination & documenting clinical information in the medical record.

## 2025-02-19 ENCOUNTER — APPOINTMENT (OUTPATIENT)
Dept: RADIOLOGY | Facility: HOSPITAL | Age: 70
End: 2025-02-19
Payer: MEDICARE

## 2025-02-20 ENCOUNTER — HOSPITAL ENCOUNTER (OUTPATIENT)
Dept: RADIOLOGY | Facility: HOSPITAL | Age: 70
Discharge: HOME | End: 2025-02-20
Payer: MEDICARE

## 2025-02-20 DIAGNOSIS — R42 DIZZINESS: ICD-10-CM

## 2025-02-20 DIAGNOSIS — R42 VERTIGO: ICD-10-CM

## 2025-02-20 PROCEDURE — 70551 MRI BRAIN STEM W/O DYE: CPT

## 2025-02-21 ENCOUNTER — CLINICAL SUPPORT (OUTPATIENT)
Dept: PHYSICAL THERAPY | Facility: CLINIC | Age: 70
End: 2025-02-21
Payer: MEDICARE

## 2025-02-21 DIAGNOSIS — R42 VERTIGO: ICD-10-CM

## 2025-02-21 DIAGNOSIS — H81.12 BPPV (BENIGN PAROXYSMAL POSITIONAL VERTIGO), LEFT: ICD-10-CM

## 2025-02-21 DIAGNOSIS — H81.12 BENIGN PAROXYSMAL POSITIONAL VERTIGO OF LEFT EAR: ICD-10-CM

## 2025-02-21 PROBLEM — H81.10 BPPV (BENIGN PAROXYSMAL POSITIONAL VERTIGO): Status: ACTIVE | Noted: 2025-02-21

## 2025-02-21 PROCEDURE — 97162 PT EVAL MOD COMPLEX 30 MIN: CPT | Mod: GP | Performed by: PHYSICAL THERAPIST

## 2025-02-21 PROCEDURE — 95992 CANALITH REPOSITIONING PROC: CPT | Mod: GP | Performed by: PHYSICAL THERAPIST

## 2025-02-21 NOTE — PROGRESS NOTES
Vestibular Physical Therapy Initial Examination Note    Patient Name: Gabriela Lopez  MRN Number: [id number]  Initial Examination Date: 2/21/2025  Referring Clinician: Nicci Mantilla PA*  Reason for Visit: Dizziness    Insurance  Visit Number: 1   Approved Visits: Medical necessity.   Certification/ POC Period: 2/21/25 -   Coverage: Payor: [facility] MEDICARE / Plan: [facility] MEDICARE / Product Type: *No Product type* /     Precautions/ History      Problem List  Problem List Items Addressed This Visit             ICD-10-CM    Vertigo R42    BPPV (benign paroxysmal positional vertigo), left - Primary H81.12     Subjective  1/15/25 she woke up with dizziness, she was spinning in bed. Same problem 4-5 years ago, fell from car at work. No reason for the dizziness 4-5 years ago. Had a lot of stress in the last year. 2-3 times a day she had dizziness. Sitting from laying down causes dizziness. Having some dizziness. Going around the [place] getting on the highway causes dizziness. CT of her neck showed severe stenosis of the right cervical vertebral artery. Does not often have balance issues, yesterday lasted for about 2 seconds. 4-5 days ago she rolled in bed and it caused dizziness. Does not have any neck stiffness or pain.     Prior level of function: No dizziness or imbalance.   Patient Goals: Reduce dizziness     Inspection  Gait: Normal  Walking with vertical head movements: not tested  Walking with horizontal head movements: not tested    Objective  Other Measures  Other Outcome Measures: DHI = 14    Cervical AROM Left Right Center Comments   Rotation stiffness stiffness     Side Flexion stiffness stiffness       Canalith Testing Left Right Comments   Posterior Canal  up beating torsional not tested loaded roly-hallpike   Horizontal Canal not tested not tested not tested     [place] SOP  Not tested     Occulomotor Testing  Gaze Center: Normal  Gaze Right: Normal  Gaze Left: Normal  Gaze Upward:  Normal    Vestibulo-Ocular Reflex Testing  Head Thrust Test/ Head Impulse Test: Normal    Assessment  Referred for acute dizziness, testing consistent with BPPV, only able to test left posterior canal on this date, remarkable and treatment performed as well. Skilled PT services essential to provide canalith testing and treatment.     Problem List: Functional Limitations/ Difficulties, Dizziness Handicap, and Positional Vertigo    Plan  Planned interventions include: Patient education and Canalith repositioning    1 visit / Week for 8 weeks    Treatment  Canalith Repositioning  - HEP printed/ issued/ discussed     Home Program  Not necessary at this time.    Goals  - Full return to prior level of function to allow continued working capability.  - Reduce DHI outcome measure goal to less than or equal to 5 for meaningful and clinical improvements in dizziness condition.  - Full resolution of dizziness to improve quality of life.  - Complete resolution of postural dizziness for improved ability to sleep, transfer in bed, bend down and look upwards.    Plan of care developed in agreement with patient.     Personal Factors Affecting Care: Comorbidities  PT Clinical Presentation: Stable and/or uncomplicated characteristics  Rehab Potential: Good       Screening  Frequency  Date Last Completed   Spiritual and Cultural Beliefs   Screening  each visit or episode of care    Falls Risk Screening  every ambulatory visit    Pain Screening  annually at primary care visit     Domestic Violence screening  annually at primary care visit    Elder Abuse Screening  annually at primary care visit    Depression Screening  annually in the primary care setting 2/18/2025   Suicide Risk Screening  annually in the primary care setting    Nutrition and Food Insecurity   Screening  at least annually at primary care visit     Key Learner  annually in the primary care setting    Drug Screen  2/18/2025  9:47 AM   Alcohol Screen  2/18/2025  9:47 AM    Advance Directive            Time Calculation  Start Time: 0707  Stop Time: 0749  Time Calculation (min): 42 min  PT Evaluation Time Entry  PT Evaluation (Moderate) Time Entry: 30      PT Modalities Time Entry  Canalith Repositioning Time Entry: 10

## 2025-02-26 ENCOUNTER — TREATMENT (OUTPATIENT)
Dept: PHYSICAL THERAPY | Facility: CLINIC | Age: 70
End: 2025-02-26
Payer: MEDICARE

## 2025-02-26 DIAGNOSIS — H81.12 BPPV (BENIGN PAROXYSMAL POSITIONAL VERTIGO), LEFT: ICD-10-CM

## 2025-02-26 DIAGNOSIS — H81.12 BENIGN PAROXYSMAL POSITIONAL VERTIGO OF LEFT EAR: ICD-10-CM

## 2025-02-26 DIAGNOSIS — R42 VERTIGO: ICD-10-CM

## 2025-02-26 PROCEDURE — 97530 THERAPEUTIC ACTIVITIES: CPT | Mod: GP | Performed by: PHYSICAL THERAPIST

## 2025-02-26 PROCEDURE — 95992 CANALITH REPOSITIONING PROC: CPT | Mod: GP | Performed by: PHYSICAL THERAPIST

## 2025-02-26 NOTE — PROGRESS NOTES
Vestibular Physical Therapy Initial Examination Note    Patient Name: Gabriela Lopez  MRN Number: [id number]  Initial Examination Date: 2/26/2025  Referring Clinician: Nicci Mantilla PA*  Reason for Visit: Dizziness    Insurance  Visit Number: 2   Approved Visits: Medical necessity.   Certification/ POC Period: 2/21/25 -   Coverage: Payor: [facility] MEDICARE / Plan: [facility] MEDICARE / Product Type: *No Product type* /     Precautions/ History      Problem List  Problem List Items Addressed This Visit             ICD-10-CM    Vertigo R42    BPPV (benign paroxysmal positional vertigo), left H81.12     Other Visit Diagnoses         Codes    Benign paroxysmal positional vertigo of left ear     H81.12          Subjective  Spinning is gone, felt great the day after therapy, no issues. The next day she did have return of dizziness. 5-6 times a day she feels unbalanced. Balance is not back to normal, what is was before.     Treatment  Canalith Repositioning   - supine roll test (-)L/ R   - supine position, non fatiguing down beating nystagmus    Therapeutic Activity   - produced emesis   - declined emergency service   - 97 SpO2, 79 bpm, monitored vitals   - 150/ 81 bpm   - patient taken home by her neighbor   - will contact primary care    Assessment  Nausea and sickness following repositioning prevented her from continuing treatment.    Problem List: Functional Limitations/ Difficulties, Dizziness Handicap, and Positional Vertigo    Plan  Continue canalith testing.     Planned interventions include: Patient education and Canalith repositioning    1 visit / Week for 8 weeks    Home Program  Not necessary at this time.    Goals  - Full return to prior level of function to allow continued working capability.  - Reduce DHI outcome measure goal to less than or equal to 5 for meaningful and clinical improvements in dizziness condition.  - Full resolution of dizziness to improve quality of life.  - Complete resolution of  postural dizziness for improved ability to sleep, transfer in bed, bend down and look upwards.    Plan of care developed in agreement with patient.     Personal Factors Affecting Care: Comorbidities  PT Clinical Presentation: Stable and/or uncomplicated characteristics  Rehab Potential: Good       Screening  Frequency  Date Last Completed   Spiritual and Cultural Beliefs   Screening  each visit or episode of care    Falls Risk Screening  every ambulatory visit    Pain Screening  annually at primary care visit     Domestic Violence screening  annually at primary care visit    Elder Abuse Screening  annually at primary care visit    Depression Screening  annually in the primary care setting 2/18/2025   Suicide Risk Screening  annually in the primary care setting    Nutrition and Food Insecurity   Screening  at least annually at primary care visit     Key Learner  annually in the primary care setting    Drug Screen  2/18/2025  9:47 AM   Alcohol Screen  2/18/2025  9:47 AM   Advance Directive            Time Calculation  Start Time: 0749  Stop Time: 0845  Time Calculation (min): 56 min     PT Therapeutic Procedures Time Entry  Therapeutic Activity Time Entry: 20   PT Modalities Time Entry  Canalith Repositioning Time Entry: 20

## 2025-02-28 DIAGNOSIS — I10 ESSENTIAL HYPERTENSION: ICD-10-CM

## 2025-03-03 ENCOUNTER — TELEPHONE (OUTPATIENT)
Dept: PRIMARY CARE | Facility: CLINIC | Age: 70
End: 2025-03-03

## 2025-03-03 RX ORDER — LOSARTAN POTASSIUM AND HYDROCHLOROTHIAZIDE 25; 100 MG/1; MG/1
1 TABLET ORAL DAILY
Qty: 30 TABLET | Refills: 11 | Status: SHIPPED | OUTPATIENT
Start: 2025-03-03

## 2025-03-03 RX ORDER — AMLODIPINE BESYLATE 5 MG/1
5 TABLET ORAL DAILY
Qty: 30 TABLET | Refills: 5 | Status: SHIPPED | OUTPATIENT
Start: 2025-03-03

## 2025-03-04 DIAGNOSIS — R42 VERTIGO: ICD-10-CM

## 2025-03-04 DIAGNOSIS — R42 DIZZINESS: ICD-10-CM

## 2025-03-05 ENCOUNTER — APPOINTMENT (OUTPATIENT)
Dept: PHYSICAL THERAPY | Facility: CLINIC | Age: 70
End: 2025-03-05
Payer: MEDICARE

## 2025-03-12 ENCOUNTER — APPOINTMENT (OUTPATIENT)
Dept: PHYSICAL THERAPY | Facility: CLINIC | Age: 70
End: 2025-03-12
Payer: MEDICARE

## 2025-03-18 ENCOUNTER — APPOINTMENT (OUTPATIENT)
Dept: PRIMARY CARE | Facility: CLINIC | Age: 70
End: 2025-03-18
Payer: COMMERCIAL

## 2025-03-31 DIAGNOSIS — E78.2 MIXED HYPERLIPIDEMIA: ICD-10-CM

## 2025-03-31 RX ORDER — ROSUVASTATIN CALCIUM 10 MG/1
10 TABLET, COATED ORAL DAILY
Qty: 90 TABLET | Refills: 3 | Status: SHIPPED | OUTPATIENT
Start: 2025-03-31 | End: 2026-05-05

## 2025-05-29 ENCOUNTER — OFFICE VISIT (OUTPATIENT)
Dept: CARDIOLOGY | Facility: CLINIC | Age: 70
End: 2025-05-29
Payer: MEDICARE

## 2025-05-29 VITALS
SYSTOLIC BLOOD PRESSURE: 140 MMHG | BODY MASS INDEX: 37.22 KG/M2 | WEIGHT: 166 LBS | DIASTOLIC BLOOD PRESSURE: 76 MMHG | OXYGEN SATURATION: 73 % | HEART RATE: 92 BPM

## 2025-05-29 DIAGNOSIS — R42 VERTIGO: ICD-10-CM

## 2025-05-29 DIAGNOSIS — I65.01 STENOSIS OF RIGHT VERTEBRAL ARTERY: Primary | ICD-10-CM

## 2025-05-29 PROCEDURE — 3075F SYST BP GE 130 - 139MM HG: CPT | Performed by: INTERNAL MEDICINE

## 2025-05-29 PROCEDURE — 1160F RVW MEDS BY RX/DR IN RCRD: CPT | Performed by: INTERNAL MEDICINE

## 2025-05-29 PROCEDURE — 99203 OFFICE O/P NEW LOW 30 MIN: CPT | Performed by: INTERNAL MEDICINE

## 2025-05-29 PROCEDURE — 1036F TOBACCO NON-USER: CPT | Performed by: INTERNAL MEDICINE

## 2025-05-29 PROCEDURE — 99213 OFFICE O/P EST LOW 20 MIN: CPT | Performed by: INTERNAL MEDICINE

## 2025-05-29 PROCEDURE — 3078F DIAST BP <80 MM HG: CPT | Performed by: INTERNAL MEDICINE

## 2025-05-29 PROCEDURE — 1159F MED LIST DOCD IN RCRD: CPT | Performed by: INTERNAL MEDICINE

## 2025-05-29 NOTE — LETTER
May 29, 2025     Nicci Mantilla PA-C  [address] [place] [facility], [address]  [facility] [address]    Patient: Gabriela Lopez   YOB: 1955   Date of Visit: 5/29/2025       Dear Dr. Nicci Mantilla PA-C:    Thank you for referring Gabriela Lopez to me for evaluation. Below are my notes for this consultation.  If you have questions, please do not hesitate to call me. I look forward to following your patient along with you.       Sincerely,     Anya Jackson MD, MS      CC: No Recipients  ______________________________________________________________________________________    Referred by Nicci Mantilla PA-C      History of Present Illness:  Gabriela Lopez is a/an 70 y.o. woman with recent episode of vertigo. Seen at a [facility] emergency department where CTA showed right vertebral artery stenosis with patent left vertebral artery. She was seen for vestibular therapy for BBPV. Also reports that she saw a chiropractor friend of hers who did an adjustment and that now here vertigo is 99% resolved.    She denies lightheadedness and syncope.    She is on statin and blood pressure control.       Medical History[1]  Surgical History[2]  Social History[3]  Family History[4]  Current Medications[5]    Physical Examination:  Blood pressure 140/76, pulse 92, weight 75.3 kg (166 lb), SpO2 (!) 73%.  General: well-developed, well-nourished, no distress  Head: normocephalic, atraumatic  Eyes: PERRL, anicteric sclerae, no conjunctival injection  ENT: normal oropharynx  Neck: supple, no carotid bruits, no JVD  Lungs: normal respiratory effort, clear to auscultation bilaterally  Heart: regular, normal S1 and S2, no murmurs, rubs or gallops  Abdomen: normal active bowel sounds, soft, non-distended, non-tender  Extremities: no cyanosis or clubbing  Vascular: no edema, pulses 2+ and symmetric  Musculoskeletal: no deformities  Neurological: alert and oriented, no gross neurological  deficits  Psychological: normal mood and affect   Skin: warm and dry, no rashes or lesions        Pertinent Labs:    Pertinent Imaging:  CTA head 2025  IMPRESSION:     Head CT:   Chronic intracranial findings.   No CT evidence of an acute intracranial abnormality.     Head CTA:   No large vessel occlusion or flow-limiting stenosis.     Neck CTA:   Severe stenosis at the right cervical vertebral artery origin.   No vessel occlusion or high-grade stenosis in the extracranial carotid   circulation.     Diagnoses and all orders for this visit:  Stenosis of right vertebral artery (Primary)  Vertigo  -     Referral to Vascular Medicine  Vertigo unlikely to be caused by unilateral vertebral artery stenosis  Regardless, it has resolved  Recommend continue statin and blood pressure regimen  No further imaging recommended    Anya Jackson MD, MS           [1]  Past Medical History:  Diagnosis Date   • Bursitis of unspecified shoulder 2014    Shoulder bursitis   • Chills (without fever) 2016    Chills (without fever)   • Encounter for screening mammogram for malignant neoplasm of breast 2014    Visit for screening mammogram   • Generalized hyperhidrosis 2016    Chronic night sweats   • Other chest pain 2015    Costochondral pain   • Other conditions influencing health status 2015    Normal endoscopy   • Personal history of other diseases of the musculoskeletal system and connective tissue     Personal history of arthritis   • Personal history of other diseases of the musculoskeletal system and connective tissue     History of bursitis   • Personal history of other specified conditions 2016    History of left flank pain   • Personal history of other specified conditions 2016    History of abdominal pain   [2]  Past Surgical History:  Procedure Laterality Date   • APPENDECTOMY  2014    Appendectomy   •  SECTION, CLASSIC  2014     Section   •  HERNIA REPAIR  07/11/2014    Hernia Repair   • INCISIONAL BREAST BIOPSY  07/11/2014    Incisional Breast Biopsy   • LITHOTRIPSY  07/11/2014    Renal Lithotripsy   • US GUIDED PERCUTANEOUS PLACEMENT  12/23/2022    US GUIDED PERCUTANEOUS PLACEMENT [facility]   [3]  Social History  Tobacco Use   • Smoking status: Never   • Smokeless tobacco: Never   Substance Use Topics   • Alcohol use: Not Currently   • Drug use: Never   [4]  No family history on file.  [5]  Current Outpatient Medications   Medication Sig Dispense Refill   • amLODIPine (Norvasc) 5 mg tablet TAKE ONE TABLET BY MOUTH EVERY DAY 30 tablet 5   • citalopram (CeleXA) 20 mg tablet Take 1 tablet (20 mg) by mouth once daily. 30 tablet 1   • dicyclomine (Bentyl) 20 mg tablet Take 1 tablet (20 mg) by mouth every 8 hours if needed.     • losartan-hydrochlorothiazide (Hyzaar) 100-25 mg tablet TAKE ONE TABLET BY MOUTH EVERY DAY 30 tablet 11   • meclizine (Antivert) 25 mg tablet Take 1 tablet (25 mg) by mouth 3 times a day as needed for dizziness. 30 tablet 0   • metoclopramide (Reglan) 5 mg tablet Take by mouth.     • metoprolol succinate XL (Toprol-XL) 50 mg 24 hr tablet TAKE ONE TABLET BY MOUTH EVERY DAY 90 tablet 1   • oxyCODONE-acetaminophen (Percocet) 5-325 mg tablet Take 1 tablet by mouth once daily as needed for severe pain (7 - 10) (renal colic). 20 tablet 0   • polymyxin B sulf-trimethoprim (Polytrim) ophthalmic solution Administer 1 drop into affected eye(s) 4 times a day.     • prednisoLONE acetate (Pred-Forte) 1 % ophthalmic suspension Administer 1 drop into affected eye(s) 4 times a day.     • rosuvastatin (Crestor) 10 mg tablet Take 1 tablet (10 mg) by mouth once daily. 90 tablet 3     No current facility-administered medications for this visit.

## 2025-05-29 NOTE — PROGRESS NOTES
Referred by Nicci Mantilla PA-C      History of Present Illness:  Gabriela Lopez is a/an 70 y.o. woman with recent episode of vertigo. Seen at a [facility] emergency department where CTA showed right vertebral artery stenosis with patent left vertebral artery. She was seen for vestibular therapy for BBPV. Also reports that she saw a chiropractor friend of hers who did an adjustment and that now here vertigo is 99% resolved.    She denies lightheadedness and syncope.    She is on statin and blood pressure control.       Medical History[1]  Surgical History[2]  Social History[3]  Family History[4]  Current Medications[5]    Physical Examination:  Blood pressure 140/76, pulse 92, weight 75.3 kg (166 lb), SpO2 (!) 73%.  General: well-developed, well-nourished, no distress  Head: normocephalic, atraumatic  Eyes: PERRL, anicteric sclerae, no conjunctival injection  ENT: normal oropharynx  Neck: supple, no carotid bruits, no JVD  Lungs: normal respiratory effort, clear to auscultation bilaterally  Heart: regular, normal S1 and S2, no murmurs, rubs or gallops  Abdomen: normal active bowel sounds, soft, non-distended, non-tender  Extremities: no cyanosis or clubbing  Vascular: no edema, pulses 2+ and symmetric  Musculoskeletal: no deformities  Neurological: alert and oriented, no gross neurological deficits  Psychological: normal mood and affect   Skin: warm and dry, no rashes or lesions        Pertinent Labs:    Pertinent Imaging:  CTA head 1/28/2025  IMPRESSION:     Head CT:   Chronic intracranial findings.   No CT evidence of an acute intracranial abnormality.     Head CTA:   No large vessel occlusion or flow-limiting stenosis.     Neck CTA:   Severe stenosis at the right cervical vertebral artery origin.   No vessel occlusion or high-grade stenosis in the extracranial carotid   circulation.     Diagnoses and all orders for this visit:  Stenosis of right vertebral artery (Primary)  Vertigo  -     Referral to Vascular  Medicine  Vertigo unlikely to be caused by unilateral vertebral artery stenosis  Regardless, it has resolved  Recommend continue statin and blood pressure regimen  No further imaging recommended    Anya Jackson MD, MS           [1]   Past Medical History:  Diagnosis Date    Bursitis of unspecified shoulder 2014    Shoulder bursitis    Chills (without fever) 2016    Chills (without fever)    Encounter for screening mammogram for malignant neoplasm of breast 2014    Visit for screening mammogram    Generalized hyperhidrosis 2016    Chronic night sweats    Other chest pain 2015    Costochondral pain    Other conditions influencing health status 2015    Normal endoscopy    Personal history of other diseases of the musculoskeletal system and connective tissue     Personal history of arthritis    Personal history of other diseases of the musculoskeletal system and connective tissue     History of bursitis    Personal history of other specified conditions 2016    History of left flank pain    Personal history of other specified conditions 2016    History of abdominal pain   [2]   Past Surgical History:  Procedure Laterality Date    APPENDECTOMY  2014    Appendectomy     SECTION, CLASSIC  2014     Section    HERNIA REPAIR  2014    Hernia Repair    INCISIONAL BREAST BIOPSY  2014    Incisional Breast Biopsy    LITHOTRIPSY  2014    Renal Lithotripsy    US GUIDED PERCUTANEOUS PLACEMENT  2022    US GUIDED PERCUTANEOUS PLACEMENT [facility]   [3]   Social History  Tobacco Use    Smoking status: Never    Smokeless tobacco: Never   Substance Use Topics    Alcohol use: Not Currently    Drug use: Never   [4] No family history on file.  [5]   Current Outpatient Medications   Medication Sig Dispense Refill    amLODIPine (Norvasc) 5 mg tablet TAKE ONE TABLET BY MOUTH EVERY DAY 30 tablet 5    citalopram (CeleXA) 20 mg tablet  Take 1 tablet (20 mg) by mouth once daily. 30 tablet 1    dicyclomine (Bentyl) 20 mg tablet Take 1 tablet (20 mg) by mouth every 8 hours if needed.      losartan-hydrochlorothiazide (Hyzaar) 100-25 mg tablet TAKE ONE TABLET BY MOUTH EVERY DAY 30 tablet 11    meclizine (Antivert) 25 mg tablet Take 1 tablet (25 mg) by mouth 3 times a day as needed for dizziness. 30 tablet 0    metoclopramide (Reglan) 5 mg tablet Take by mouth.      metoprolol succinate XL (Toprol-XL) 50 mg 24 hr tablet TAKE ONE TABLET BY MOUTH EVERY DAY 90 tablet 1    oxyCODONE-acetaminophen (Percocet) 5-325 mg tablet Take 1 tablet by mouth once daily as needed for severe pain (7 - 10) (renal colic). 20 tablet 0    polymyxin B sulf-trimethoprim (Polytrim) ophthalmic solution Administer 1 drop into affected eye(s) 4 times a day.      prednisoLONE acetate (Pred-Forte) 1 % ophthalmic suspension Administer 1 drop into affected eye(s) 4 times a day.      rosuvastatin (Crestor) 10 mg tablet Take 1 tablet (10 mg) by mouth once daily. 90 tablet 3     No current facility-administered medications for this visit.

## 2025-06-05 ENCOUNTER — APPOINTMENT (OUTPATIENT)
Dept: CARDIOLOGY | Facility: CLINIC | Age: 70
End: 2025-06-05
Payer: MEDICARE

## 2025-06-24 DIAGNOSIS — I10 BENIGN ESSENTIAL HYPERTENSION: ICD-10-CM

## 2025-06-24 RX ORDER — METOPROLOL SUCCINATE 50 MG/1
50 TABLET, EXTENDED RELEASE ORAL DAILY
Qty: 90 TABLET | Refills: 1 | Status: SHIPPED | OUTPATIENT
Start: 2025-06-24

## 2025-07-08 ENCOUNTER — APPOINTMENT (OUTPATIENT)
Dept: OPHTHALMOLOGY | Facility: CLINIC | Age: 70
End: 2025-07-08
Payer: MEDICARE

## 2025-07-23 DIAGNOSIS — Z12.31 ENCOUNTER FOR SCREENING MAMMOGRAM FOR BREAST CANCER: ICD-10-CM

## 2025-09-01 DIAGNOSIS — I10 ESSENTIAL HYPERTENSION: ICD-10-CM

## 2025-09-02 RX ORDER — AMLODIPINE BESYLATE 5 MG/1
5 TABLET ORAL DAILY
Qty: 90 TABLET | Refills: 1 | Status: SHIPPED | OUTPATIENT
Start: 2025-09-02

## 2025-09-04 ENCOUNTER — APPOINTMENT (OUTPATIENT)
Dept: PRIMARY CARE | Facility: CLINIC | Age: 70
End: 2025-09-04
Payer: MEDICARE

## 2025-09-04 ASSESSMENT — ENCOUNTER SYMPTOMS
HEADACHES: 0
NAUSEA: 0
CONSTITUTIONAL NEGATIVE: 1
BACK PAIN: 0
NEUROLOGICAL NEGATIVE: 1
LOSS OF SENSATION IN FEET: 0
DEPRESSION: 0
ENDOCRINE NEGATIVE: 1
EYES NEGATIVE: 1
OCCASIONAL FEELINGS OF UNSTEADINESS: 0
ALLERGIC/IMMUNOLOGIC NEGATIVE: 1
DIZZINESS: 0
MUSCULOSKELETAL NEGATIVE: 1
COUGH: 0
NERVOUS/ANXIOUS: 0
PALPITATIONS: 0
ARTHRALGIAS: 0
VOMITING: 0
PSYCHIATRIC NEGATIVE: 1
RESPIRATORY NEGATIVE: 1
WHEEZING: 0
SHORTNESS OF BREATH: 0
ABDOMINAL PAIN: 0
HEMATOLOGIC/LYMPHATIC NEGATIVE: 1

## 2025-09-04 ASSESSMENT — PATIENT HEALTH QUESTIONNAIRE - PHQ9
2. FEELING DOWN, DEPRESSED OR HOPELESS: NOT AT ALL
1. LITTLE INTEREST OR PLEASURE IN DOING THINGS: NOT AT ALL
SUM OF ALL RESPONSES TO PHQ9 QUESTIONS 1 AND 2: 0

## 2025-11-04 ENCOUNTER — APPOINTMENT (OUTPATIENT)
Dept: NEUROLOGY | Facility: CLINIC | Age: 70
End: 2025-11-04
Payer: MEDICARE

## 2026-02-18 ENCOUNTER — APPOINTMENT (OUTPATIENT)
Dept: PRIMARY CARE | Facility: CLINIC | Age: 71
End: 2026-02-18
Payer: MEDICARE